# Patient Record
Sex: FEMALE | Race: WHITE | Employment: OTHER | URBAN - METROPOLITAN AREA
[De-identification: names, ages, dates, MRNs, and addresses within clinical notes are randomized per-mention and may not be internally consistent; named-entity substitution may affect disease eponyms.]

---

## 2020-10-08 ENCOUNTER — TELEPHONE (OUTPATIENT)
Dept: ADMINISTRATIVE | Age: 80
End: 2020-10-08

## 2020-10-08 NOTE — TELEPHONE ENCOUNTER
Patient called stated she was seen at  [facility] and her PCP Dr. Neha Tariq referred her to see Dr. Sudarshan Walker For a right broken arm, she can be reached at [phone].

## 2020-10-14 ENCOUNTER — OFFICE VISIT (OUTPATIENT)
Dept: ORTHOPEDIC SURGERY | Age: 80
End: 2020-10-14
Payer: MEDICARE

## 2020-10-14 ENCOUNTER — HOSPITAL ENCOUNTER (OUTPATIENT)
Dept: GENERAL RADIOLOGY | Age: 80
Discharge: HOME | End: 2020-10-16
Payer: MEDICARE

## 2020-10-14 VITALS
WEIGHT: 170 LBS | HEART RATE: 90 BPM | HEIGHT: 58 IN | DIASTOLIC BLOOD PRESSURE: 73 MMHG | SYSTOLIC BLOOD PRESSURE: 151 MMHG | BODY MASS INDEX: 35.68 KG/M2

## 2020-10-14 PROBLEM — S42.301A CLOSED FRACTURE OF RIGHT UPPER LIMB: Status: ACTIVE | Noted: 2020-10-14

## 2020-10-14 PROCEDURE — 99202 OFFICE O/P NEW SF 15 MIN: CPT

## 2020-10-14 PROCEDURE — 73080 X-RAY EXAM OF ELBOW: CPT

## 2020-10-14 PROCEDURE — 99203 OFFICE O/P NEW LOW 30 MIN: CPT | Performed by: PHYSICIAN ASSISTANT

## 2020-10-14 PROCEDURE — 73060 X-RAY EXAM OF HUMERUS: CPT

## 2020-10-14 RX ORDER — ACETAMINOPHEN 500 MG
500 TABLET ORAL EVERY 6 HOURS PRN
Status: ON HOLD | COMMUNITY
End: 2020-10-26 | Stop reason: HOSPADM

## 2020-10-14 SDOH — HEALTH STABILITY: MENTAL HEALTH: HOW OFTEN DO YOU HAVE A DRINK CONTAINING ALCOHOL?: NEVER

## 2020-10-14 NOTE — PROGRESS NOTES
Orthopaedic H&P Note    Ines Rueda is a 78 y.o. female, her YOB: 1940 with the following history as recorded in [facility]:      Patient Active Problem List    Diagnosis Date Noted    Closed fracture of right upper limb 10/14/2020     Current Outpatient Medications   Medication Sig Dispense Refill    acetaminophen (TYLENOL) 500 MG tablet Take 500 mg by mouth every 6 hours as needed for Pain       No current facility-administered medications for this visit. Allergies: Patient has no known allergies. History reviewed. No pertinent past medical history. Past Surgical History:   Procedure Laterality Date    HIP FRACTURE SURGERY Right     @ [facility], Dr Jerrell Nolan     History reviewed. No pertinent family history. Social History     Tobacco Use    Smoking status: Never Smoker    Smokeless tobacco: Never Used   Substance Use Topics    Alcohol use: Never     Frequency: Never                             Chief Complaint   Patient presents with    Arm Pain     patient fell on 10/5/20 while walking in [place]; patient was taken to ER at [facility]; 5/10 pain; patient states that if she does not move her arm it does not hurt; she has been taking tylenol for the pain    Other     right handed; patient lives with her  and daughter stops over all the time       SUBJECTIVE: Ines Rueda is here for initial evaluation for their right elbow. States that they had injured it after falling. DOI: 10/5/2020. Was seen in ER at [facility] and XRs revealed distal humerus fracture. They were placed in a long posterior splint and referred here. Denies any other injuries, and no issues with this elbow prior to injury. Denies any numbness or tingling. Pain tolerable currently with medications, taking tylenol. Patient is right hand dominant. Review of Systems   Constitutional: Negative for fever, chills, diaphoresis, appetite change and fatigue. HENT: Negative for dental issues, hearing loss and tinnitus. Negative for congestion, sinus pressure, sneezing, sore throat. Negative for headache. Eyes: Negative for visual disturbance, blurred and double vision. Negative for pain, discharge, redness and itching  Respiratory: Negative for cough, shortness of breath and wheezing. Cardiovascular: Negative for chest pain, palpitations and leg swelling. No dyspnea on exertion   Gastrointestinal:   Negative for nausea, vomiting, abdominal pain, diarrhea, constipation  or black or bloody. Hematologic\Lymphatic:  negative for bleeding, petechiae,   Genitourinary: Negative for hematuria and difficulty urinating. Musculoskeletal: Negative for neck pain and stiffness. Mild for back pain, negative joint swelling and gait problem. Skin: Negative for pallor, rash and wound. Neurological: Negative for dizziness, tremors, seizures, weakness, light-headedness, no TIA or stroke symptoms. No numbness and headaches. Psychiatric/Behavioral: Negative. Physical Examination:   General appearance: alert, well appearing, and in no distress,  normal appearing weight  Mental status: alert, oriented to person, place, and time, normal mood, behavior, speech, dress, motor activity, and thought processes  Abdomen: soft, nondistended, nontender, bowel sound + X 4 quads  Resp:   resp easy and unlabored, equal, regular rate, no wheezes, rhonchi, crackles noted  Cardiac: distal pulses palpable, skin well perfused.  HR regular rhythm and rate, no murmers, rubs, or clicks  Neurological: alert, oriented X3, normal speech, no focal findings or movement disorder noted, motor and sensory grossly normal bilaterally, normal muscle tone, no tremors, strength 5/5, normal gait and station  HEENT: normochephalic atraumatic, external ears and eyes normal, sclera normal, neck supple  Extremities:   peripheral pulses normal, no edema, redness or tenderness in the calves   Skin: normal coloration, no rashes or open wounds, no suspicious skin lesions noted  Psych: Affect euthymic   Musculoskeletal:    Extremity:  Right Upper Extremity  Splint maintained  Splint is clean, dry, intact  Moderate edema noted to the fingers with ecchymosis noted  Radial pulse palpable, fingers warm with BCR  Flex/extension intact to wrist, thumb and fingers  Finger opposition intact  Finger adduction/abduction intact  Finger crossover intact  Subjectively states sensation intact to radial/medial/ulnar distribution                 BP (!) 151/73 (Site: Left Upper Arm, Position: Sitting, Cuff Size: Medium Adult)   Pulse 90   Ht 4' 10\" (1.473 m)   Wt 170 lb (77.1 kg)   BMI 35.53 kg/m²      XR: 10/14/20 multiple views of the right humerus and right elbow are obtained with splint maintained. Demonstrates a displaced distal humerus fracture, unable to fully evaluate fracture pattern given splinting and positioning. ASSESSMENT:     Diagnosis Orders   1. Closed fracture of right upper extremity, initial encounter  XR HUMERUS RIGHT (MIN 2 VIEWS)    XR ELBOW RIGHT (MIN 3 VIEWS)    CT ELBOW RIGHT WO CONTRAST   2. Other closed displaced fracture of distal end of right humerus, initial encounter         Discussion:  Had lengthy discussion with patient regarding Her diagnosis, typical prognosis, and expected outcomes. I reviewed the possible complications from the injury itself despite treatment choosen. I also discussed treatment options including nonoperative managements versus surgical management, along with risks and benefits of each. They have elected for surgical management at this time. Discussed with patient factors that can impact patient's fracture healing. Patient has the following risk factors for union: Advanced Age    Risk, benefits and treatment options discussed with Stephanie Chambers.  she has verbalized understanding of options.  The possibility of complications were also discussed to include but not limited to nerve damage, infection, problems with wound healing, vascular injury, chronic pain, stiffness, dysfunction, nonhealing of the bone, symptomatic hardware and/or its failure, need for subsequent surgery, dislocation, and blood clots as well as medical related problems and other problems not specifically discussed. Risk of anesthesia also discussed to include death. Post-op care, work, activity and restrictions which included the use of pain medication and possibility of using blood thinner post op were also discussed with Stephanie Trish and she verbalized and agreed with the restrictions. PLAN:  Pre-OP CT for surgical planning  Plan for OR on 10/26/2020 tentatively with Dr. Hannah Sims MD  Pre-surgery medical clearance- request faxed to Dr. Strange Rise office today  NPO after midnight the night before surgery  NWB on right upper extremity  Splint care  Continue ice and elevation as able  Sling for comfort  Hold NSAIDS 7 days prior to surgery  Hold aspirin 1 prior to surgery    Electronically signed by Buddy Ortiz PA-C on 10/14/2020 at 11:18 AM  Note: This report was completed using [employer] voiced recognition software. Every effort has been made to ensure accuracy; however, inadvertent computerized transcription errors may be present.

## 2020-10-14 NOTE — PROGRESS NOTES
Chief Complaint   Patient presents with    Arm Pain     patient fell on 10/5/20 while walking in [place]; patient was taken to ER at [facility]; 5/10 pain; patient states that if she does not move her arm it does not hurt; she has been taking tylenol for the pain    Other     right handed; patient lives with her  and daughter stops over all the time       SUBJECTIVE:    Review of Systems   Constitutional: Negative for fever, chills, diaphoresis, appetite change and fatigue. HENT: Negative for dental issues, hearing loss and tinnitus. Negative for congestion, sinus pressure, sneezing, sore throat. Negative for headache. Eyes: Negative for visual disturbance, blurred and double vision. Negative for pain, discharge, redness and itching  Respiratory: Negative for cough, shortness of breath and wheezing. Cardiovascular: Negative for chest pain, palpitations and leg swelling. No dyspnea on exertion   Gastrointestinal:   Negative for nausea, vomiting, abdominal pain, diarrhea, constipation  or black or bloody. Hematologic\Lymphatic:  negative for bleeding, petechiae,   Genitourinary: Negative for hematuria and difficulty urinating. Musculoskeletal: Negative for neck pain and stiffness. Negative for back pain, see HPI  Skin: Negative for pallor, rash and wound. Neurological: Negative for dizziness, tremors, seizures, weakness, light-headedness, no TIA or stroke symptoms. No numbness and headaches. Psychiatric/Behavioral: Negative. OBJECTIVE:      Physical Examination:   General appearance: alert, well appearing, and in no distress,  normal appearing weight.  No visible signs of trauma   Mental status: alert, oriented to person, place, and time, normal mood, behavior, speech, dress, motor activity, and thought processes  Abdomen: soft, nondistended  Resp:   resp easy and unlabored, no audible wheezes note, normal symmetrical expansion of both hemithoraces  Cardiac: distal pulses palpable,

## 2020-10-15 ENCOUNTER — PREP FOR PROCEDURE (OUTPATIENT)
Dept: ORTHOPEDIC SURGERY | Age: 80
End: 2020-10-15

## 2020-10-15 ENCOUNTER — TELEPHONE (OUTPATIENT)
Dept: ADMINISTRATIVE | Age: 80
End: 2020-10-15

## 2020-10-15 RX ORDER — SODIUM CHLORIDE 0.9 % (FLUSH) 0.9 %
10 SYRINGE (ML) INJECTION EVERY 12 HOURS SCHEDULED
Status: CANCELLED | OUTPATIENT
Start: 2020-10-15

## 2020-10-15 RX ORDER — SODIUM CHLORIDE 0.9 % (FLUSH) 0.9 %
10 SYRINGE (ML) INJECTION PRN
Status: CANCELLED | OUTPATIENT
Start: 2020-10-15

## 2020-10-15 NOTE — H&P (VIEW-ONLY)
Orthopaedic H&P Note     Suresh Hackett is a 78 y.o. female, her YOB: 1940 with the following history as recorded in [facility]:             Patient Active Problem List     Diagnosis Date Noted    Closed fracture of right upper limb 10/14/2020      Current Facility-Administered Medications          Current Outpatient Medications   Medication Sig Dispense Refill    acetaminophen (TYLENOL) 500 MG tablet Take 500 mg by mouth every 6 hours as needed for Pain          No current facility-administered medications for this visit. Allergies: Patient has no known allergies. Past Medical History   History reviewed. No pertinent past medical history. Past Surgical History         Past Surgical History:   Procedure Laterality Date    HIP FRACTURE SURGERY Right       @ [facility], Dr Johnny Tena        Family History   History reviewed. No pertinent family history. Social History            Tobacco Use    Smoking status: Never Smoker    Smokeless tobacco: Never Used   Substance Use Topics    Alcohol use: Never       Frequency: Never                               Chief Complaint   Patient presents with    Arm Pain       patient fell on 10/5/20 while walking in [place]; patient was taken to ER at [facility]; 5/10 pain; patient states that if she does not move her arm it does not hurt; she has been taking tylenol for the pain    Other       right handed; patient lives with her  and daughter stops over all the time         SUBJECTIVE: Suresh Hackett is here for initial evaluation for their right elbow. States that they had injured it after falling. DOI: 10/5/2020. Was seen in ER at [facility] and XRs revealed distal humerus fracture. They were placed in a long posterior splint and referred here. Denies any other injuries, and no issues with this elbow prior to injury. Denies any numbness or tingling. Pain tolerable currently with medications, taking tylenol. Patient is right hand dominant.    Review of Systems   Constitutional: Negative for fever, chills, diaphoresis, appetite change and fatigue. HENT: Negative for dental issues, hearing loss and tinnitus. Negative for congestion, sinus pressure, sneezing, sore throat. Negative for headache. Eyes: Negative for visual disturbance, blurred and double vision. Negative for pain, discharge, redness and itching  Respiratory: Negative for cough, shortness of breath and wheezing. Cardiovascular: Negative for chest pain, palpitations and leg swelling. No dyspnea on exertion   Gastrointestinal:   Negative for nausea, vomiting, abdominal pain, diarrhea, constipation  or black or bloody. Hematologic\Lymphatic:  negative for bleeding, petechiae,   Genitourinary: Negative for hematuria and difficulty urinating. Musculoskeletal: Negative for neck pain and stiffness. Mild for back pain, negative joint swelling and gait problem. Skin: Negative for pallor, rash and wound. Neurological: Negative for dizziness, tremors, seizures, weakness, light-headedness, no TIA or stroke symptoms. No numbness and headaches. Psychiatric/Behavioral: Negative.      Physical Examination:   General appearance: alert, well appearing, and in no distress,  normal appearing weight  Mental status: alert, oriented to person, place, and time, normal mood, behavior, speech, dress, motor activity, and thought processes  Abdomen: soft, nondistended, nontender, bowel sound + X 4 quads  Resp:   resp easy and unlabored, equal, regular rate, no wheezes, rhonchi, crackles noted  Cardiac: distal pulses palpable, skin well perfused.  HR regular rhythm and rate, no murmers, rubs, or clicks  Neurological: alert, oriented X3, normal speech, no focal findings or movement disorder noted, motor and sensory grossly normal bilaterally, normal muscle tone, no tremors, strength 5/5, normal gait and station  HEENT: normochephalic atraumatic, external ears and eyes normal, sclera normal, neck supple  Extremities:   peripheral pulses normal, no edema, redness or tenderness in the calves   Skin: normal coloration, no rashes or open wounds, no suspicious skin lesions noted  Psych: Affect euthymic   Musculoskeletal:    Extremity:  Right Upper Extremity  Splint maintained  Splint is clean, dry, intact  Moderate edema noted to the fingers with ecchymosis noted  Radial pulse palpable, fingers warm with BCR  Flex/extension intact to wrist, thumb and fingers  Finger opposition intact  Finger adduction/abduction intact  Finger crossover intact  Subjectively states sensation intact to radial/medial/ulnar distribution                   BP (!) 151/73 (Site: Left Upper Arm, Position: Sitting, Cuff Size: Medium Adult)   Pulse 90   Ht 4' 10\" (1.473 m)   Wt 170 lb (77.1 kg)   BMI 35.53 kg/m²      XR: 10/14/20 multiple views of the right humerus and right elbow are obtained with splint maintained. Demonstrates a displaced distal humerus fracture, unable to fully evaluate fracture pattern given splinting and positioning.     ASSESSMENT:       Diagnosis Orders   1. Closed fracture of right upper extremity, initial encounter  XR HUMERUS RIGHT (MIN 2 VIEWS)     XR ELBOW RIGHT (MIN 3 VIEWS)     CT ELBOW RIGHT WO CONTRAST   2. Other closed displaced fracture of distal end of right humerus, initial encounter            Discussion:  Had lengthy discussion with patient regarding Her diagnosis, typical prognosis, and expected outcomes. I reviewed the possible complications from the injury itself despite treatment choosen. I also discussed treatment options including nonoperative managements versus surgical management, along with risks and benefits of each. They have elected for surgical management at this time.       Discussed with patient factors that can impact patient's fracture healing.  Patient has the following risk factors for union: Advanced Age     Risk, benefits and treatment options discussed with Inna Montes.  she has verbalized understanding of options. The possibility of complications were also discussed to include but not limited to nerve damage, infection, problems with wound healing, vascular injury, chronic pain, stiffness, dysfunction, nonhealing of the bone, symptomatic hardware and/or its failure, need for subsequent surgery, dislocation, and blood clots as well as medical related problems and other problems not specifically discussed. Risk of anesthesia also discussed to include death.    Post-op care, work, activity and restrictions which included the use of pain medication and possibility of using blood thinner post op were also discussed with Sarahi Dumas and she verbalized and agreed with the restrictions.      PLAN:  Pre-OP CT for surgical planning  Plan for OR on 10/26/2020 tentatively with Dr. Daysi Walsh MD  Pre-surgery medical clearance- request faxed to Dr. Toi Childs office today  NPO after midnight the night before surgery  NWB on right upper extremity  Splint care  Continue ice and elevation as able  Sling for comfort  Hold NSAIDS 7 days prior to surgery  Hold aspirin 1 prior to surgery

## 2020-10-15 NOTE — H&P
Orthopaedic H&P Note     Jian Aguero is a 78 y.o. female, her YOB: 1940 [facility] the [facility]             Patient Active Problem List     Diagnosis Date Noted    Closed fracture of right upper limb 10/14/2020      Current Facility-Administered Medications          Current Outpatient Medications   Medication Sig Dispense Refill    acetaminophen (TYLENOL) 500 MG tablet Take 500 mg by mouth every 6 hours as needed for Pain          No current facility-administered medications for this visit. Allergies: Patient has no known allergies. Past Medical History   History reviewed. No pertinent past medical history. Past Surgical History         Past Surgical History:   Procedure Laterality Date    HIP FRACTURE SURGERY Right       @ [facility], Dr Arlene Avalos        Family History   History reviewed. No pertinent family history. Social History            Tobacco Use    Smoking status: Never Smoker    Smokeless tobacco: Never Used   Substance Use Topics    Alcohol use: Never       Frequency: Never                               Chief Complaint   Patient presents with    Arm Pain       patient fell on 10/5/20 while walking in [place]; patient was taken to ER at [facility]; 5/10 pain; patient states that if she does not move her arm it does not hurt; she has been taking tylenol for the pain    Other       right handed; patient lives with her  and daughter stops over all the time         SUBJECTIVE: Jian Aguero is here for initial evaluation for their right elbow. States that they had injured it after falling. DOI: 10/5/2020. Was seen in ER at [facility] and XRs revealed distal humerus fracture. They were placed in a long posterior splint and referred here. Denies any other injuries, and no issues with this elbow prior to injury. Denies any numbness or tingling. Pain tolerable currently with medications, taking tylenol. Patient is right hand dominant.    Review of Systems   Constitutional: Negative for fever, chills, diaphoresis, appetite change and fatigue. HENT: Negative for dental issues, hearing loss and tinnitus. Negative for congestion, sinus pressure, sneezing, sore throat. Negative for headache. Eyes: Negative for visual disturbance, blurred and double vision. Negative for pain, discharge, redness and itching  Respiratory: Negative for cough, shortness of breath and wheezing. Cardiovascular: Negative for chest pain, palpitations and leg swelling. No dyspnea on exertion   Gastrointestinal:   Negative for nausea, vomiting, abdominal pain, diarrhea, constipation  or black or bloody. Hematologic\Lymphatic:  negative for bleeding, petechiae,   Genitourinary: Negative for hematuria and difficulty urinating. Musculoskeletal: Negative for neck pain and stiffness. Mild for back pain, negative joint swelling and gait problem. Skin: Negative for pallor, rash and wound. Neurological: Negative for dizziness, tremors, seizures, weakness, light-headedness, no TIA or stroke symptoms. No numbness and headaches. Psychiatric/Behavioral: Negative.      Physical Examination:   General appearance: alert, well appearing, and in no distress,  normal appearing weight  Mental status: alert, oriented to person, place, and time, normal mood, behavior, speech, dress, motor activity, and thought processes  Abdomen: soft, nondistended, nontender, bowel sound + X 4 quads  Resp:   resp easy and unlabored, equal, regular rate, no wheezes, rhonchi, crackles noted  Cardiac: distal pulses palpable, skin well perfused.  HR regular rhythm and rate, no murmers, rubs, or clicks  Neurological: alert, oriented X3, normal speech, no focal findings or movement disorder noted, motor and sensory grossly normal bilaterally, normal muscle tone, no tremors, strength 5/5, normal gait and station  HEENT: normochephalic atraumatic, external ears and eyes normal, sclera normal, neck verbalized understanding of options. The possibility of complications were also discussed to include but not limited to nerve damage, infection, problems with wound healing, vascular injury, chronic pain, stiffness, dysfunction, nonhealing of the bone, symptomatic hardware and/or its failure, need for subsequent surgery, dislocation, and blood clots as well as medical related problems and other problems not specifically discussed. Risk of anesthesia also discussed to include death.    Post-op care, work, activity and restrictions which included the use of pain medication and possibility of using blood thinner post op were also discussed with Stephanie Chambers and she verbalized and agreed with the restrictions.      PLAN:  Pre-OP CT for surgical planning  Plan for OR on 10/26/2020 tentatively with Dr. Hannah Sims MD  Pre-surgery medical clearance- request faxed to Dr. Alie Duvall office today  NPO after midnight the night before surgery  NWB on right upper extremity  Splint care  Continue ice and elevation as able  Sling for comfort  Hold NSAIDS 7 days prior to surgery  Hold aspirin 1 prior to surgery

## 2020-10-19 ENCOUNTER — HOSPITAL ENCOUNTER (OUTPATIENT)
Dept: CT IMAGING | Age: 80
Discharge: HOME | End: 2020-10-21
Payer: MEDICARE

## 2020-10-19 PROCEDURE — 73200 CT UPPER EXTREMITY W/O DYE: CPT

## 2020-10-20 ENCOUNTER — OFFICE VISIT (OUTPATIENT)
Dept: ORTHOPEDIC SURGERY | Age: 80
End: 2020-10-20
Payer: MEDICARE

## 2020-10-20 ENCOUNTER — HOSPITAL ENCOUNTER (OUTPATIENT)
Age: 80
Discharge: HOME | End: 2020-10-22
Payer: MEDICARE

## 2020-10-20 VITALS
DIASTOLIC BLOOD PRESSURE: 89 MMHG | BODY MASS INDEX: 35.68 KG/M2 | HEART RATE: 86 BPM | TEMPERATURE: 98.2 F | HEIGHT: 58 IN | WEIGHT: 170 LBS | SYSTOLIC BLOOD PRESSURE: 148 MMHG

## 2020-10-20 PROCEDURE — 99213 OFFICE O/P EST LOW 20 MIN: CPT | Performed by: PHYSICIAN ASSISTANT

## 2020-10-20 PROCEDURE — 99212 OFFICE O/P EST SF 10 MIN: CPT | Performed by: PHYSICIAN ASSISTANT

## 2020-10-20 PROCEDURE — U0003 INFECTIOUS AGENT DETECTION BY NUCLEIC ACID (DNA OR RNA); SEVERE ACUTE RESPIRATORY SYNDROME CORONAVIRUS 2 (SARS-COV-2) (CORONAVIRUS DISEASE [COVID-19]), AMPLIFIED PROBE TECHNIQUE, MAKING USE OF HIGH THROUGHPUT TECHNOLOGIES AS DESCRIBED BY CMS-2020-01-R: HCPCS

## 2020-10-20 NOTE — PROGRESS NOTES
change and fatigue. HENT: Negative for dental issues, hearing loss and tinnitus. Negative for congestion, sinus pressure, sneezing, sore throat. Negative for headache. Eyes: Negative for visual disturbance, blurred and double vision. Negative for pain, discharge, redness and itching  Respiratory: Negative for cough, shortness of breath and wheezing. Cardiovascular: Negative for chest pain, palpitations and leg swelling. No dyspnea on exertion   Gastrointestinal:   Negative for nausea, vomiting, abdominal pain, diarrhea, constipation  or black or bloody. Hematologic\Lymphatic:  negative for bleeding, petechiae,   Genitourinary: Negative for hematuria and difficulty urinating. Musculoskeletal: Negative for neck pain and stiffness. Mild for back pain, negative joint swelling and gait problem. Skin: Negative for pallor, rash and wound. Neurological: Negative for dizziness, tremors, seizures, weakness, light-headedness, no TIA or stroke symptoms. No numbness and headaches. Psychiatric/Behavioral: Negative. Physical Examination:   General appearance: alert, well appearing, and in no distress,  normal appearing weight  Mental status: alert, oriented to person, place, and time, normal mood, behavior, speech, dress, motor activity, and thought processes  Abdomen: soft, nondistended, nontender, bowel sound + X 4 quads  Resp:   resp easy and unlabored, equal, regular rate, no wheezes, rhonchi, crackles noted  Cardiac: distal pulses palpable, skin well perfused.  HR regular rhythm and rate, no murmers, rubs, or clicks  Neurological: alert, oriented X3, normal speech, no focal findings or movement disorder noted, motor and sensory grossly normal bilaterally, normal muscle tone, no tremors, strength 5/5, normal gait and station  HEENT: normochephalic atraumatic, external ears and eyes normal, sclera normal, neck supple  Extremities:   peripheral pulses normal, no edema, redness or tenderness in the calves   Skin: normal coloration, no rashes or open wounds, no suspicious skin lesions noted  Psych: Affect euthymic   Musculoskeletal:    Extremity:  Right Upper Extremity  Splint maintained  Splint is clean, dry, intact  Mild noted to the fingers with ecchymosis noted- improving since last visit  Radial pulse palpable, fingers warm with BCR  Flex/extension intact to wrist, thumb and fingers  Finger opposition intact  Finger adduction/abduction intact  Finger crossover intact  Subjectively states sensation intact to radial/medial/ulnar distribution    BP (!) 148/89 (Site: Left Upper Arm, Position: Sitting)   Pulse 86   Temp 98.2 °F (36.8 °C) (Oral)   Ht 4' 10\" (1.473 m)   Wt 170 lb (77.1 kg) Comment: per patient  BMI 35.53 kg/m²         Bones: Acute transversely oriented comminuted posteriorly and laterally    displaced condylar fracture of the distal humerus.  There is up to 8 mm    posterior displacement of the medial epicondyle on image 60, series 5.  There    is up to 7 mm of lateral displacement of the condylar fracture component in    relation to the humeral diaphyseal component on image 62, series 6.         Radial head and radial neck appear intact.         Soft Tissue:  Gallbladder filled with gallstones.         Respiratory motion throughout the visualized portions of the lungs.         Moderate edema in the soft tissues about the right elbow and proximal right    forearm.         Joint:  Mild degenerative changes of the ulnohumeral and radiohumeral joints.         Moderate joint effusion.              Impression    1. Acute transversely oriented comminuted posteriorly and laterally displaced    condylar fracture of the distal humerus with up to 8 mm of posterior and 7 mm    lateral displacement of the condylar fracture component in relation to the    humeral diaphyseal component. Of note, CT was reviewed and case discussed with Dr. Beny Lira as well today and he is recommending surgical intervention.  Separate discussion will be had face to face with Dr. Pablo Singer the morning of surgery with patient regarding risks etc.     ASSESSMENT:     Diagnosis Orders   1. Screening for viral disease  COVID-19 Ambulatory   2. Other closed displaced fracture of distal end of right humerus, initial encounter         Discussion:  Dr. Tereso Camarena and myself had lengthy discussion with patient and her family regarding Her diagnosis, typical prognosis, and expected outcomes. I reviewed the possible complications from the injury itself despite treatment choosen. I also discussed treatment options including nonoperative managements versus surgical management, along with risks and benefits of each. They have elected for surgical management at this time. Discussed with patient factors that can impact patient's fracture healing. Patient has the following risk factors for union: Advanced Age    Risk, benefits and treatment options discussed with Frandy Smith.  she has verbalized understanding of options. The possibility of complications were also discussed to include but not limited to nerve damage, infection, problems with wound healing, vascular injury, chronic pain, stiffness, dysfunction, nonhealing of the bone, symptomatic hardware and/or its failure, need for subsequent surgery, dislocation, and blood clots as well as medical related problems and other problems not specifically discussed. Risk of anesthesia also discussed to include death. Post-op care, work, activity and restrictions which included the use of pain medication and possibility of using blood thinner post op were also discussed with Frandy Smith and she verbalized and agreed with the restrictions.      PLAN:  Plan for OR on 10/26/2020 with Dr. Sanju Purcell MD  Pre-surgery medical clearance, patient states sees PCP this week  NPO after midnight the night before surgery  NWB on right upper extremity  COVID 19  Splint care  Hold NSAIDS 7 days prior to surgery  Hold aspirin 1 prior to surgery    Electronically signed by Pola Dorsey PA-C on 10/23/2020 at 7:43 AM  Note: This report was completed using [email or web address] voiced recognition software. Every effort has been made to ensure accuracy; however, inadvertent computerized transcription errors may be present.

## 2020-10-20 NOTE — PATIENT INSTRUCTIONS
1. Your surgery is scheduled for Right Distal Humerus Open Reduction with Internal Fixation at 8:30 AM on 10/26/2020  with Dr. Arlene Bernal MD at the main [address] [place] [address] in [facility] . You will need to report to Preop area  that morning at 6:30 AM    2. You are having Outpatient surgery so you will be returning home the same day  3. Preadmission Testing (PAT) department at [facility] will contact you with all the details prior to surgery. 4. Nothing to eat or drink after midnight the night before surgery. You may take a pain pill and any other medicine PAT instructs you to take with small sip of water if needed. 5. Keep splint clean and dry. Do not remove or get wet. 6. Continue with ice and elevation to reduce swelling  7. No weight bearing right upper extremity, use assistive devices  8. Take pain medicine as instructed  9. Call office with any question or concerns: [phone]. Hold Aspirin the day of surgery. Hold all NSAIDs 7 days prior to surgery  11. Pre OP COVID 19 testing    All surgical patients will be gradually tapered off narcotic pain medication post operatively, this office will not continue narcotics longer than 6 weeks from date of surgery. If narcotics are required longer than this time period without objective finding you will be referred to pain management.       OUTPATIENT ORTHOPEDIC SURGERY  PRE-OP COVID TESTING     We need to have COVID-19 Testing done 4 days (not including Sunday) prior to your scheduled surgery     After your testing is completed you will need to Self Quarantine until date of surgery     If your surgery is scheduled for:  Monday- Testing needs to be done Wednesday    Locations and hours are listed below:    [address], [place] --Across from [phone] signage will be posted but for those accessing the [place] site, note that they should enter from [facility] onto [facility]. [address] [facility] [address]    Each of these sites will be open Monday through Friday from 6 a.m. to 2:30 p.m.

## 2020-10-21 NOTE — PROGRESS NOTES
[facility] PRE-ADMISSION TESTING GENERAL INSTRUCTIONS- [facility]-phone number:[phone]    GENERAL INSTRUCTIONS  [x] Antibacterial Soap shower Night before and/or AM of Surgery  [] Chandrakant wipe instruction sheet and wipes given. [x] Nothing by mouth after midnight, including gum, candy, mints, or water. [x] You may brush your teeth, gargle, but do NOT swallow water. []Hibiclens shower  the night before and the morning of surgery. Do not use             Hibiclens on your face or head. [x]No smoking, chewing tobacco, illegal drugs, or alcohol within 24 hours of your surgery. [x] Jewelry, valuables or body piercing's should not be brought to the hospital. All body and/or tongue piercing's must be removed prior to arriving to hospital.  ALL hair pins must be removed. [x] Do not wear makeup, lotions, powders, deodorant. Nail polish as directed by the nurse. [x] Arrange transportation with a responsible adult  to and from the hospital. If you do not have a responsible adult  to transport you, you will need to make arrangements with a medical transportation company (i.e. [employer]. A Uber/taxi/bus is not appropriate unless you are accompanied by a responsible adult ). Arrange for someone to be with you for the remainder of the day and for 24 hours after your procedure due to having had anesthesia. Who will be your  for transportation?    Who will be staying with you for 24 hrs after your procedure?   [x] Bring insurance card and photo ID.  [] Transfusion Bracelet: Please bring with you to hospital, day of surgery  [] Bring urine specimen day of surgery. Any small container is acceptable. [] Use inhalers the morning of surgery and bring with you to hospital.  [] Bring copy of living will or healthcare power of  papers to be placed in your electronic record.   [] CPAP/BI-PAP: Please bring your machine if you are to spend the night in the hospital.     PARKING INSTRUCTIONS:   [x] Arrival Time: 0630 main entrance, wear mask  · [] Parking lot '\"I\"  is located on [facility] (the corner of [facility] and [facility]). To enter, press the button and the gate will lift. A free token will be provided to exit the lot. One car per patient is allowed to park in this lot. All other cars are to park on [address] [place] [address] Street either in the parking garage or the handicap lot. [x] To reach the [facility] lobby from [address] [place] [address], upon entering the hospital, take elevator B to the 3rd floor. EDUCATION INSTRUCTIONS:      [] Knee or hip replacement booklet & exercise pamphlets given. [] [address] placed in chart. [] Pre-admission Testing educational folder given  [] Incentive Spirometry,coughing & deep breathing exercises reviewed. []Medication information sheet(s)   []Fluoroscopy-Xray used in surgery reviewed with patient. Educational pamphlet placed in chart. [x]Pain: Post-op pain is normal and to be expected. You will be asked to rate your pain from 0-10(a zero is not acceptable-education is needed). Your post-op pain goal is:5  [] Ask your nurse for your pain medication. [] Joint camp offered. [] Joint replacement booklets given. [] Other:___________________________    MEDICATION INSTRUCTIONS:   [x]Bring a complete list of your medications, please write the last time you took the medicine, give this list to the nurse.   [] Take the following medications the morning of surgery with 1-2 ounces of water:   [x] Stop herbal supplements and vitamins 5 days before your surgery. [] DO NOT take any diabetic medicine the morning of surgery. Follow instructions for insulin the day before surgery. [] If you are diabetic and your blood sugar is low or you feel symptomatic, you may drink 1-2 ounces of apple juice or take a glucose tablet.   The morning of your procedure, you may call the pre-op area if you have concerns about your blood sugar [phone]. [] Use your inhalers the morning of surgery. Bring your emergency inhaler with you day of surgery. [] Follow physician instructions regarding any blood thinners you may be taking. WHAT TO EXPECT:  [x] The day of surgery you will be greeted and checked in by the [employer].  In addition, you will be registered in the [place] by a Patient Access Representative. Please bring your photo ID and insurance card. A nurse will greet you in accordance to the time you are needed in the pre-op area to prepare you for surgery. Please do not be discouraged if you are not greeted in the order you arrive as there are many variables that are involved in patient preparation. Your patience is greatly appreciated as you wait for your nurse. Please bring in items such as: books, magazines, newspapers, electronics, or any other items  to occupy your time in the waiting area. [x]  Delays may occur with surgery and staff will make a sincere effort to keep you informed of delays. If any delays occur with your procedure, we apologize ahead of time for your inconvenience as we recognize the value of your time.

## 2020-10-22 LAB
SARS-COV-2: NOT DETECTED
SOURCE: NORMAL

## 2020-10-23 ENCOUNTER — OFFICE VISIT (OUTPATIENT)
Dept: CARDIOLOGY CLINIC | Age: 80
End: 2020-10-23
Payer: MEDICARE

## 2020-10-23 VITALS
WEIGHT: 174 LBS | SYSTOLIC BLOOD PRESSURE: 106 MMHG | HEIGHT: 58 IN | HEART RATE: 92 BPM | DIASTOLIC BLOOD PRESSURE: 60 MMHG | RESPIRATION RATE: 16 BRPM | BODY MASS INDEX: 36.53 KG/M2

## 2020-10-23 PROCEDURE — 99203 OFFICE O/P NEW LOW 30 MIN: CPT | Performed by: INTERNAL MEDICINE

## 2020-10-23 PROCEDURE — 93000 ELECTROCARDIOGRAM COMPLETE: CPT | Performed by: INTERNAL MEDICINE

## 2020-10-23 NOTE — PROGRESS NOTES
OUTPATIENT CARDIOLOGY CONSULT    Name: Lena Lindo    Age: 78 y.o. Date of Service: 10/23/2020    Reason for Consultation: Preoperative cardiac evaluation for elbow surgery    Referring Physician: Fadumo Rutherford DO    History of Present Illness: Lena Lindo is a 78 y.o. female who presents today for preoperative cardiac evaluation. She has no cardiac history, specifically no coronary artery disease, congestive heart failure, arrhythmias, or valvular heart disease. No history of hypertension, diabetes, hyperlipidemia, and she is a lifetime non-smoker. She suffered a mechanical fall when she tripped the post office and broke her right elbow, and is planned for surgical repair next week. She has prior history of a fall a few years ago and required hip surgery that was done at [facility].    She does not routinely exercise, but is active and always up and about doing work at the house. She denies any physical limitation. She does not get short of breath or chest pain. She can walk up a flight of stairs without problems. Long distance ambulation is somewhat limited because of her hip pain related to her surgery. Review of Systems:  Complete review of systems otherwise negative except as described above. Past Medical History:  No past medical history on file. None    Past Surgical History:  Past Surgical History:   Procedure Laterality Date    HIP FRACTURE SURGERY Right     @ [facility], Dr Adi Peck       Family History:  No family history on file.    No family history of premature coronary artery disease    Social History:  Social History     Tobacco Use    Smoking status: Never Smoker    Smokeless tobacco: Never Used   Substance Use Topics    Alcohol use: Never     Frequency: Never    Drug use: Never       Allergies:  No Known Allergies    Current Medications:    Current Outpatient Medications:     acetaminophen (TYLENOL) 500 MG tablet, Take 500 mg by mouth every 6 hours as needed for Pain, Disp: , Rfl:     Physical Exam:  /60   Pulse 92   Resp 16   Ht 4' 10\" (1.473 m)   Wt 174 lb (78.9 kg)   BMI 36.37 kg/m²   Wt Readings from Last 3 Encounters:   10/23/20 174 lb (78.9 kg)   10/20/20 170 lb (77.1 kg)   10/14/20 170 lb (77.1 kg)     Appearance: Well-appearing older female, awake, alert, no acute respiratory distress  Skin: Intact, no rash  Eyes: EOMI, no conjunctival erythema  ENMT: Moist mucous membranes. Neck: Supple, no elevated JVP, no carotid bruits  Lungs: Clear to auscultation bilaterally. No wheezes, rales, or rhonchi. Cardiac: Regular rhythm with a normal rate. S1 & S2 normal, no murmurs  Abdomen: Soft, nontender, +bowel sounds  Extremities: Moves all extremities x 4, trace lower extremity edema. Right arm in a cast  Neurologic: No focal motor deficits apparent, normal mood and affect  Peripheral Pulses: Intact posterior tibial pulses bilaterally    Laboratory Tests:  None available    Cardiac Tests:  ECG: Sinus rhythm 92 bpm.  First-degree AV block, MS interval 234 ms. Normal axis. Poor R wave progression. Echocardiogram: N/A    Stress test: N/A    Cardiac catheterization: N/A    Orders Placed This Encounter   Procedures    EKG 12 Lead        Requested Prescriptions      No prescriptions requested or ordered in this encounter        ASSESSMENT / PLAN:  1. Preoperative cardiac evaluation for elbow surgery  2. Mechanical fall with fracture of the distal right humerus  3. First-degree AV block  4. Obesity, BMI 36.4 kg/m²  5. History of right hip fracture with surgical repair      Recommendations:  She is asymptomatic from cardiac standpoint. She has no active cardiac conditions, specifically no ischemic heart disease, congestive heart failure, significant valvular disease by exam, or arrhythmias. She has good functional capacity can exert greater than 4 METS and is asymptomatic. Her baseline EKG shows nonspecific changes.     · Low risk from a cardiac standpoint for a scheduled elbow surgery, and may proceed without further cardiac evaluation  · Aggressive risk factor modification  · Encouraged to increase physical activity and incorporate daily walking, as well as dietary modifications for weight reduction  · Follow-up as needed    Thank you for allowing me to participate in your patient's care. Please feel free to contact me if you have any questions or concerns.     Royal Kuldeep MD  [facility] ([facility]) Cardiology

## 2020-10-26 ENCOUNTER — ANESTHESIA EVENT (OUTPATIENT)
Dept: OPERATING ROOM | Age: 80
End: 2020-10-26
Payer: MEDICARE

## 2020-10-26 ENCOUNTER — APPOINTMENT (OUTPATIENT)
Dept: GENERAL RADIOLOGY | Age: 80
End: 2020-10-26
Attending: ORTHOPAEDIC SURGERY
Payer: MEDICARE

## 2020-10-26 ENCOUNTER — HOSPITAL ENCOUNTER (OUTPATIENT)
Age: 80
Setting detail: OUTPATIENT SURGERY
Discharge: HOME | End: 2020-10-26
Attending: ORTHOPAEDIC SURGERY | Admitting: ORTHOPAEDIC SURGERY
Payer: MEDICARE

## 2020-10-26 ENCOUNTER — ANESTHESIA (OUTPATIENT)
Dept: OPERATING ROOM | Age: 80
End: 2020-10-26
Payer: MEDICARE

## 2020-10-26 VITALS — DIASTOLIC BLOOD PRESSURE: 55 MMHG | TEMPERATURE: 95.2 F | SYSTOLIC BLOOD PRESSURE: 112 MMHG | OXYGEN SATURATION: 92 %

## 2020-10-26 VITALS
SYSTOLIC BLOOD PRESSURE: 123 MMHG | HEART RATE: 86 BPM | WEIGHT: 170 LBS | DIASTOLIC BLOOD PRESSURE: 52 MMHG | TEMPERATURE: 97.5 F | OXYGEN SATURATION: 93 % | BODY MASS INDEX: 35.68 KG/M2 | RESPIRATION RATE: 16 BRPM | HEIGHT: 58 IN

## 2020-10-26 LAB
ALBUMIN SERPL-MCNC: 3.5 G/DL (ref 3.5–5.2)
ALP BLD-CCNC: 82 U/L (ref 35–104)
ALT SERPL-CCNC: 7 U/L (ref 0–32)
ANION GAP SERPL CALCULATED.3IONS-SCNC: 12 MMOL/L (ref 7–16)
AST SERPL-CCNC: 17 U/L (ref 0–31)
BASOPHILS ABSOLUTE: 0.03 E9/L (ref 0–0.2)
BASOPHILS RELATIVE PERCENT: 0.5 % (ref 0–2)
BILIRUB SERPL-MCNC: 1 MG/DL (ref 0–1.2)
BUN BLDV-MCNC: 10 MG/DL (ref 8–23)
CALCIUM SERPL-MCNC: 8.5 MG/DL (ref 8.6–10.2)
CHLORIDE BLD-SCNC: 104 MMOL/L (ref 98–107)
CO2: 25 MMOL/L (ref 22–29)
CREAT SERPL-MCNC: 0.8 MG/DL (ref 0.5–1)
EOSINOPHILS ABSOLUTE: 0.14 E9/L (ref 0.05–0.5)
EOSINOPHILS RELATIVE PERCENT: 2.3 % (ref 0–6)
GFR AFRICAN AMERICAN: >60
GFR NON-AFRICAN AMERICAN: >60 ML/MIN/1.73
GLUCOSE BLD-MCNC: 99 MG/DL (ref 74–99)
HCT VFR BLD CALC: 35.9 % (ref 34–48)
HEMOGLOBIN: 11.3 G/DL (ref 11.5–15.5)
IMMATURE GRANULOCYTES #: 0.01 E9/L
IMMATURE GRANULOCYTES %: 0.2 % (ref 0–5)
LYMPHOCYTES ABSOLUTE: 1.33 E9/L (ref 1.5–4)
LYMPHOCYTES RELATIVE PERCENT: 21.8 % (ref 20–42)
MCH RBC QN AUTO: 30.4 PG (ref 26–35)
MCHC RBC AUTO-ENTMCNC: 31.5 % (ref 32–34.5)
MCV RBC AUTO: 96.5 FL (ref 80–99.9)
MONOCYTES ABSOLUTE: 0.47 E9/L (ref 0.1–0.95)
MONOCYTES RELATIVE PERCENT: 7.7 % (ref 2–12)
NEUTROPHILS ABSOLUTE: 4.13 E9/L (ref 1.8–7.3)
NEUTROPHILS RELATIVE PERCENT: 67.5 % (ref 43–80)
PDW BLD-RTO: 15.7 FL (ref 11.5–15)
PLATELET # BLD: 323 E9/L (ref 130–450)
PMV BLD AUTO: 10.4 FL (ref 7–12)
POTASSIUM SERPL-SCNC: 3.9 MMOL/L (ref 3.5–5)
RBC # BLD: 3.72 E12/L (ref 3.5–5.5)
SODIUM BLD-SCNC: 141 MMOL/L (ref 132–146)
TOTAL PROTEIN: 7.1 G/DL (ref 6.4–8.3)
WBC # BLD: 6.1 E9/L (ref 4.5–11.5)

## 2020-10-26 PROCEDURE — 3700000000 HC ANESTHESIA ATTENDED CARE: Performed by: ORTHOPAEDIC SURGERY

## 2020-10-26 PROCEDURE — 2580000003 HC RX 258: Performed by: ORTHOPAEDIC SURGERY

## 2020-10-26 PROCEDURE — 2720000010 HC SURG SUPPLY STERILE: Performed by: ORTHOPAEDIC SURGERY

## 2020-10-26 PROCEDURE — 6360000002 HC RX W HCPCS: Performed by: PHYSICIAN ASSISTANT

## 2020-10-26 PROCEDURE — 7100000000 HC PACU RECOVERY - FIRST 15 MIN: Performed by: ORTHOPAEDIC SURGERY

## 2020-10-26 PROCEDURE — 3700000001 HC ADD 15 MINUTES (ANESTHESIA): Performed by: ORTHOPAEDIC SURGERY

## 2020-10-26 PROCEDURE — 3600000015 HC SURGERY LEVEL 5 ADDTL 15MIN: Performed by: ORTHOPAEDIC SURGERY

## 2020-10-26 PROCEDURE — 7100000001 HC PACU RECOVERY - ADDTL 15 MIN: Performed by: ORTHOPAEDIC SURGERY

## 2020-10-26 PROCEDURE — 6360000002 HC RX W HCPCS: Performed by: ANESTHESIOLOGY

## 2020-10-26 PROCEDURE — 3209999900 FLUORO FOR SURGICAL PROCEDURES

## 2020-10-26 PROCEDURE — 36415 COLL VENOUS BLD VENIPUNCTURE: CPT

## 2020-10-26 PROCEDURE — 6360000002 HC RX W HCPCS: Performed by: REGISTERED NURSE

## 2020-10-26 PROCEDURE — 2500000003 HC RX 250 WO HCPCS: Performed by: REGISTERED NURSE

## 2020-10-26 PROCEDURE — 80053 COMPREHEN METABOLIC PANEL: CPT

## 2020-10-26 PROCEDURE — 85025 COMPLETE CBC W/AUTO DIFF WBC: CPT

## 2020-10-26 PROCEDURE — C1713 ANCHOR/SCREW BN/BN,TIS/BN: HCPCS | Performed by: ORTHOPAEDIC SURGERY

## 2020-10-26 PROCEDURE — 3600000005 HC SURGERY LEVEL 5 BASE: Performed by: ORTHOPAEDIC SURGERY

## 2020-10-26 PROCEDURE — 73070 X-RAY EXAM OF ELBOW: CPT

## 2020-10-26 PROCEDURE — 24546 OPTX HUM FX W/NTRCNDYLR XTN: CPT | Performed by: ORTHOPAEDIC SURGERY

## 2020-10-26 PROCEDURE — 64415 NJX AA&/STRD BRCH PLXS IMG: CPT | Performed by: ANESTHESIOLOGY

## 2020-10-26 PROCEDURE — 2709999900 HC NON-CHARGEABLE SUPPLY: Performed by: ORTHOPAEDIC SURGERY

## 2020-10-26 PROCEDURE — 7100000010 HC PHASE II RECOVERY - FIRST 15 MIN: Performed by: ORTHOPAEDIC SURGERY

## 2020-10-26 PROCEDURE — 6370000000 HC RX 637 (ALT 250 FOR IP): Performed by: ANESTHESIOLOGY

## 2020-10-26 PROCEDURE — 6370000000 HC RX 637 (ALT 250 FOR IP): Performed by: ORTHOPAEDIC SURGERY

## 2020-10-26 PROCEDURE — 7100000011 HC PHASE II RECOVERY - ADDTL 15 MIN: Performed by: ORTHOPAEDIC SURGERY

## 2020-10-26 DEVICE — SCREW BNE L20MM DIA2.7MM ANK S STL ST VAR ANG LOK FULL THRD: Type: IMPLANTABLE DEVICE | Site: ELBOW | Status: FUNCTIONAL

## 2020-10-26 DEVICE — SCREW BNE L26MM DIA2.7MM MTPHSEAL EL S STL ST VAR ANG LOK: Type: IMPLANTABLE DEVICE | Site: ELBOW | Status: FUNCTIONAL

## 2020-10-26 DEVICE — SCREW BNE L36MM DIA2.7MM ANK S STL ST VAR ANG LOK FULL THRD: Type: IMPLANTABLE DEVICE | Site: ELBOW | Status: FUNCTIONAL

## 2020-10-26 DEVICE — SCREW BNE L22MM DIA3.5MM CORT S STL ST NONCANNULATED LOK: Type: IMPLANTABLE DEVICE | Site: ELBOW | Status: FUNCTIONAL

## 2020-10-26 DEVICE — SCREW BNE L42MM DIA2.7MM ANK S STL ST VAR ANG LOK FULL THRD: Type: IMPLANTABLE DEVICE | Site: ELBOW | Status: FUNCTIONAL

## 2020-10-26 DEVICE — SCREW BNE L50MM DIA2.7MM S STL ST VAR ANG LOK FULL THRD T8: Type: IMPLANTABLE DEVICE | Site: ELBOW | Status: FUNCTIONAL

## 2020-10-26 DEVICE — SCREW BNE L30MM DIA2.7MM ANK S STL ST VAR ANG LOK FULL THRD: Type: IMPLANTABLE DEVICE | Site: ELBOW | Status: FUNCTIONAL

## 2020-10-26 DEVICE — IMPLANTABLE DEVICE: Type: IMPLANTABLE DEVICE | Site: ELBOW | Status: FUNCTIONAL

## 2020-10-26 DEVICE — SCREW CORTX SLFTP FTHRD 3.5X18MM: Type: IMPLANTABLE DEVICE | Site: ELBOW | Status: FUNCTIONAL

## 2020-10-26 DEVICE — SCREW BNE L14MM DIA2.7MM ANK S STL ST VAR ANG LOK FULL THRD: Type: IMPLANTABLE DEVICE | Site: ELBOW | Status: FUNCTIONAL

## 2020-10-26 DEVICE — SCREW BNE L40MM DIA2.7MM ANK S STL ST VAR ANG LOK FULL THRD: Type: IMPLANTABLE DEVICE | Site: ELBOW | Status: FUNCTIONAL

## 2020-10-26 DEVICE — SCREW BNE L20MM DIA3.5MM CORT S STL ST NONCANNULATED LOK: Type: IMPLANTABLE DEVICE | Site: ELBOW | Status: FUNCTIONAL

## 2020-10-26 DEVICE — SCREW BNE L24MM DIA3.5MM CORT S STL ST NONCANNULATED LOK: Type: IMPLANTABLE DEVICE | Site: ELBOW | Status: FUNCTIONAL

## 2020-10-26 RX ORDER — FENTANYL CITRATE 50 UG/ML
100 INJECTION, SOLUTION INTRAMUSCULAR; INTRAVENOUS ONCE
Status: COMPLETED | OUTPATIENT
Start: 2020-10-26 | End: 2020-10-26

## 2020-10-26 RX ORDER — DIAPER,BRIEF,INFANT-TODD,DISP
EACH MISCELLANEOUS PRN
Status: DISCONTINUED | OUTPATIENT
Start: 2020-10-26 | End: 2020-10-26 | Stop reason: ALTCHOICE

## 2020-10-26 RX ORDER — OXYCODONE HYDROCHLORIDE AND ACETAMINOPHEN 5; 325 MG/1; MG/1
1 TABLET ORAL EVERY 6 HOURS PRN
Qty: 28 TABLET | Refills: 0 | Status: SHIPPED | OUTPATIENT
Start: 2020-10-26 | End: 2020-11-02

## 2020-10-26 RX ORDER — HYDRALAZINE HYDROCHLORIDE 20 MG/ML
INJECTION INTRAMUSCULAR; INTRAVENOUS PRN
Status: DISCONTINUED | OUTPATIENT
Start: 2020-10-26 | End: 2020-10-26 | Stop reason: SDUPTHER

## 2020-10-26 RX ORDER — ROPIVACAINE HYDROCHLORIDE 5 MG/ML
30 INJECTION, SOLUTION EPIDURAL; INFILTRATION; PERINEURAL ONCE
Status: COMPLETED | OUTPATIENT
Start: 2020-10-26 | End: 2020-10-26

## 2020-10-26 RX ORDER — ROCURONIUM BROMIDE 10 MG/ML
INJECTION, SOLUTION INTRAVENOUS PRN
Status: DISCONTINUED | OUTPATIENT
Start: 2020-10-26 | End: 2020-10-26 | Stop reason: SDUPTHER

## 2020-10-26 RX ORDER — PROMETHAZINE HYDROCHLORIDE 25 MG/ML
6.25 INJECTION, SOLUTION INTRAMUSCULAR; INTRAVENOUS EVERY 10 MIN PRN
Status: DISCONTINUED | OUTPATIENT
Start: 2020-10-26 | End: 2020-10-26 | Stop reason: HOSPADM

## 2020-10-26 RX ORDER — PHENYLEPHRINE HCL IN 0.9% NACL 1 MG/10 ML
SYRINGE (ML) INTRAVENOUS PRN
Status: DISCONTINUED | OUTPATIENT
Start: 2020-10-26 | End: 2020-10-26 | Stop reason: SDUPTHER

## 2020-10-26 RX ORDER — HYDROCODONE BITARTRATE AND ACETAMINOPHEN 5; 325 MG/1; MG/1
1 TABLET ORAL PRN
Status: COMPLETED | OUTPATIENT
Start: 2020-10-26 | End: 2020-10-26

## 2020-10-26 RX ORDER — MORPHINE SULFATE 2 MG/ML
2 INJECTION, SOLUTION INTRAMUSCULAR; INTRAVENOUS EVERY 5 MIN PRN
Status: DISCONTINUED | OUTPATIENT
Start: 2020-10-26 | End: 2020-10-26 | Stop reason: HOSPADM

## 2020-10-26 RX ORDER — LIDOCAINE HYDROCHLORIDE 20 MG/ML
INJECTION, SOLUTION INTRAVENOUS PRN
Status: DISCONTINUED | OUTPATIENT
Start: 2020-10-26 | End: 2020-10-26 | Stop reason: SDUPTHER

## 2020-10-26 RX ORDER — FENTANYL CITRATE 50 UG/ML
INJECTION, SOLUTION INTRAMUSCULAR; INTRAVENOUS PRN
Status: DISCONTINUED | OUTPATIENT
Start: 2020-10-26 | End: 2020-10-26 | Stop reason: SDUPTHER

## 2020-10-26 RX ORDER — ASPIRIN 81 MG/1
81 TABLET ORAL DAILY
Qty: 28 TABLET | Refills: 0 | Status: SHIPPED | OUTPATIENT
Start: 2020-10-26 | End: 2020-11-23

## 2020-10-26 RX ORDER — LABETALOL HYDROCHLORIDE 5 MG/ML
INJECTION, SOLUTION INTRAVENOUS PRN
Status: DISCONTINUED | OUTPATIENT
Start: 2020-10-26 | End: 2020-10-26 | Stop reason: SDUPTHER

## 2020-10-26 RX ORDER — SODIUM CHLORIDE 9 MG/ML
INJECTION, SOLUTION INTRAVENOUS CONTINUOUS
Status: DISCONTINUED | OUTPATIENT
Start: 2020-10-26 | End: 2020-10-26 | Stop reason: HOSPADM

## 2020-10-26 RX ORDER — SODIUM CHLORIDE 0.9 % (FLUSH) 0.9 %
10 SYRINGE (ML) INJECTION PRN
Status: DISCONTINUED | OUTPATIENT
Start: 2020-10-26 | End: 2020-10-26 | Stop reason: HOSPADM

## 2020-10-26 RX ORDER — MORPHINE SULFATE 2 MG/ML
1 INJECTION, SOLUTION INTRAMUSCULAR; INTRAVENOUS EVERY 5 MIN PRN
Status: DISCONTINUED | OUTPATIENT
Start: 2020-10-26 | End: 2020-10-26 | Stop reason: HOSPADM

## 2020-10-26 RX ORDER — MEPERIDINE HYDROCHLORIDE 25 MG/ML
12.5 INJECTION INTRAMUSCULAR; INTRAVENOUS; SUBCUTANEOUS EVERY 5 MIN PRN
Status: DISCONTINUED | OUTPATIENT
Start: 2020-10-26 | End: 2020-10-26 | Stop reason: HOSPADM

## 2020-10-26 RX ORDER — DEXAMETHASONE SODIUM PHOSPHATE 10 MG/ML
INJECTION, SOLUTION INTRAMUSCULAR; INTRAVENOUS PRN
Status: DISCONTINUED | OUTPATIENT
Start: 2020-10-26 | End: 2020-10-26 | Stop reason: SDUPTHER

## 2020-10-26 RX ORDER — HYDROCODONE BITARTRATE AND ACETAMINOPHEN 5; 325 MG/1; MG/1
2 TABLET ORAL PRN
Status: COMPLETED | OUTPATIENT
Start: 2020-10-26 | End: 2020-10-26

## 2020-10-26 RX ORDER — ROPIVACAINE HYDROCHLORIDE 5 MG/ML
INJECTION, SOLUTION EPIDURAL; INFILTRATION; PERINEURAL
Status: COMPLETED | OUTPATIENT
Start: 2020-10-26 | End: 2020-10-26

## 2020-10-26 RX ORDER — GLYCOPYRROLATE 1 MG/5 ML
SYRINGE (ML) INTRAVENOUS PRN
Status: DISCONTINUED | OUTPATIENT
Start: 2020-10-26 | End: 2020-10-26 | Stop reason: SDUPTHER

## 2020-10-26 RX ORDER — HYDROMORPHONE HCL 110MG/55ML
PATIENT CONTROLLED ANALGESIA SYRINGE INTRAVENOUS PRN
Status: DISCONTINUED | OUTPATIENT
Start: 2020-10-26 | End: 2020-10-26 | Stop reason: SDUPTHER

## 2020-10-26 RX ORDER — NEOSTIGMINE METHYLSULFATE 1 MG/ML
INJECTION, SOLUTION INTRAVENOUS PRN
Status: DISCONTINUED | OUTPATIENT
Start: 2020-10-26 | End: 2020-10-26 | Stop reason: SDUPTHER

## 2020-10-26 RX ORDER — SODIUM CHLORIDE 0.9 % (FLUSH) 0.9 %
10 SYRINGE (ML) INJECTION EVERY 12 HOURS SCHEDULED
Status: DISCONTINUED | OUTPATIENT
Start: 2020-10-26 | End: 2020-10-26 | Stop reason: HOSPADM

## 2020-10-26 RX ORDER — MIDAZOLAM HYDROCHLORIDE 1 MG/ML
2 INJECTION INTRAMUSCULAR; INTRAVENOUS ONCE
Status: COMPLETED | OUTPATIENT
Start: 2020-10-26 | End: 2020-10-26

## 2020-10-26 RX ORDER — PROPOFOL 10 MG/ML
INJECTION, EMULSION INTRAVENOUS PRN
Status: DISCONTINUED | OUTPATIENT
Start: 2020-10-26 | End: 2020-10-26 | Stop reason: SDUPTHER

## 2020-10-26 RX ORDER — ONDANSETRON 2 MG/ML
INJECTION INTRAMUSCULAR; INTRAVENOUS PRN
Status: DISCONTINUED | OUTPATIENT
Start: 2020-10-26 | End: 2020-10-26 | Stop reason: SDUPTHER

## 2020-10-26 RX ADMIN — ROCURONIUM BROMIDE 10 MG: 10 INJECTION, SOLUTION INTRAVENOUS at 10:32

## 2020-10-26 RX ADMIN — ROCURONIUM BROMIDE 40 MG: 10 INJECTION, SOLUTION INTRAVENOUS at 09:03

## 2020-10-26 RX ADMIN — LABETALOL HYDROCHLORIDE 5 MG: 5 INJECTION INTRAVENOUS at 10:39

## 2020-10-26 RX ADMIN — Medication 3 MG: at 11:50

## 2020-10-26 RX ADMIN — PROPOFOL 110 MG: 10 INJECTION, EMULSION INTRAVENOUS at 09:03

## 2020-10-26 RX ADMIN — FENTANYL CITRATE 50 MCG: 50 INJECTION, SOLUTION INTRAMUSCULAR; INTRAVENOUS at 09:03

## 2020-10-26 RX ADMIN — FENTANYL CITRATE 50 MCG: 50 INJECTION, SOLUTION INTRAMUSCULAR; INTRAVENOUS at 08:42

## 2020-10-26 RX ADMIN — HYDROCODONE BITARTRATE AND ACETAMINOPHEN 1 TABLET: 5; 325 TABLET ORAL at 14:22

## 2020-10-26 RX ADMIN — SODIUM CHLORIDE: 9 INJECTION, SOLUTION INTRAVENOUS at 07:28

## 2020-10-26 RX ADMIN — DEXAMETHASONE SODIUM PHOSPHATE 10 MG: 10 INJECTION, SOLUTION INTRAMUSCULAR; INTRAVENOUS at 09:30

## 2020-10-26 RX ADMIN — FENTANYL CITRATE 50 MCG: 50 INJECTION, SOLUTION INTRAMUSCULAR; INTRAVENOUS at 09:44

## 2020-10-26 RX ADMIN — Medication 100 MCG: at 11:49

## 2020-10-26 RX ADMIN — MORPHINE SULFATE 1 MG: 2 INJECTION, SOLUTION INTRAMUSCULAR; INTRAVENOUS at 13:14

## 2020-10-26 RX ADMIN — ROPIVACAINE HYDROCHLORIDE 30 ML: 5 INJECTION EPIDURAL; INFILTRATION; PERINEURAL at 08:53

## 2020-10-26 RX ADMIN — MIDAZOLAM 1 MG: 1 INJECTION INTRAMUSCULAR; INTRAVENOUS at 08:42

## 2020-10-26 RX ADMIN — LIDOCAINE HYDROCHLORIDE 100 MG: 20 INJECTION, SOLUTION INTRAVENOUS at 09:03

## 2020-10-26 RX ADMIN — HYDROMORPHONE HYDROCHLORIDE 0.5 MG: 2 INJECTION, SOLUTION INTRAMUSCULAR; INTRAVENOUS; SUBCUTANEOUS at 11:20

## 2020-10-26 RX ADMIN — ONDANSETRON HYDROCHLORIDE 4 MG: 2 INJECTION, SOLUTION INTRAMUSCULAR; INTRAVENOUS at 09:30

## 2020-10-26 RX ADMIN — SODIUM CHLORIDE: 9 INJECTION, SOLUTION INTRAVENOUS at 08:57

## 2020-10-26 RX ADMIN — HYDRALAZINE HYDROCHLORIDE 5 MG: 20 INJECTION INTRAMUSCULAR; INTRAVENOUS at 10:07

## 2020-10-26 RX ADMIN — ROPIVACAINE HYDROCHLORIDE 30 ML: 5 INJECTION, SOLUTION EPIDURAL; INFILTRATION; PERINEURAL at 08:46

## 2020-10-26 RX ADMIN — ROCURONIUM BROMIDE 10 MG: 10 INJECTION, SOLUTION INTRAVENOUS at 09:47

## 2020-10-26 RX ADMIN — Medication 2 G: at 09:25

## 2020-10-26 RX ADMIN — Medication 0.6 MG: at 11:50

## 2020-10-26 RX ADMIN — SODIUM CHLORIDE: 9 INJECTION, SOLUTION INTRAVENOUS at 11:50

## 2020-10-26 ASSESSMENT — PULMONARY FUNCTION TESTS
PIF_VALUE: 13
PIF_VALUE: 22
PIF_VALUE: 21
PIF_VALUE: 13
PIF_VALUE: 13
PIF_VALUE: 23
PIF_VALUE: 23
PIF_VALUE: 21
PIF_VALUE: 23
PIF_VALUE: 20
PIF_VALUE: 14
PIF_VALUE: 21
PIF_VALUE: 21
PIF_VALUE: 25
PIF_VALUE: 21
PIF_VALUE: 23
PIF_VALUE: 23
PIF_VALUE: 21
PIF_VALUE: 23
PIF_VALUE: 21
PIF_VALUE: 22
PIF_VALUE: 20
PIF_VALUE: 22
PIF_VALUE: 1
PIF_VALUE: 25
PIF_VALUE: 3
PIF_VALUE: 10
PIF_VALUE: 22
PIF_VALUE: 13
PIF_VALUE: 16
PIF_VALUE: 4
PIF_VALUE: 21
PIF_VALUE: 14
PIF_VALUE: 22
PIF_VALUE: 20
PIF_VALUE: 21
PIF_VALUE: 21
PIF_VALUE: 22
PIF_VALUE: 16
PIF_VALUE: 21
PIF_VALUE: 21
PIF_VALUE: 12
PIF_VALUE: 1
PIF_VALUE: 22
PIF_VALUE: 1
PIF_VALUE: 19
PIF_VALUE: 12
PIF_VALUE: 15
PIF_VALUE: 22
PIF_VALUE: 23
PIF_VALUE: 21
PIF_VALUE: 15
PIF_VALUE: 21
PIF_VALUE: 23
PIF_VALUE: 22
PIF_VALUE: 22
PIF_VALUE: 0
PIF_VALUE: 21
PIF_VALUE: 21
PIF_VALUE: 22
PIF_VALUE: 21
PIF_VALUE: 18
PIF_VALUE: 22
PIF_VALUE: 21
PIF_VALUE: 20
PIF_VALUE: 22
PIF_VALUE: 13
PIF_VALUE: 22
PIF_VALUE: 20
PIF_VALUE: 20
PIF_VALUE: 13
PIF_VALUE: 21
PIF_VALUE: 21
PIF_VALUE: 17
PIF_VALUE: 22
PIF_VALUE: 23
PIF_VALUE: 17
PIF_VALUE: 15
PIF_VALUE: 21
PIF_VALUE: 23
PIF_VALUE: 23
PIF_VALUE: 16
PIF_VALUE: 19
PIF_VALUE: 4
PIF_VALUE: 1
PIF_VALUE: 22
PIF_VALUE: 15
PIF_VALUE: 17
PIF_VALUE: 21
PIF_VALUE: 17
PIF_VALUE: 16
PIF_VALUE: 23
PIF_VALUE: 22
PIF_VALUE: 23
PIF_VALUE: 21
PIF_VALUE: 15
PIF_VALUE: 21
PIF_VALUE: 3
PIF_VALUE: 22
PIF_VALUE: 21
PIF_VALUE: 23
PIF_VALUE: 21
PIF_VALUE: 13
PIF_VALUE: 13
PIF_VALUE: 21
PIF_VALUE: 10
PIF_VALUE: 12
PIF_VALUE: 20
PIF_VALUE: 21
PIF_VALUE: 21
PIF_VALUE: 22
PIF_VALUE: 2
PIF_VALUE: 20
PIF_VALUE: 21
PIF_VALUE: 20
PIF_VALUE: 22
PIF_VALUE: 23
PIF_VALUE: 20
PIF_VALUE: 21
PIF_VALUE: 20
PIF_VALUE: 1
PIF_VALUE: 22
PIF_VALUE: 13
PIF_VALUE: 21
PIF_VALUE: 23
PIF_VALUE: 16
PIF_VALUE: 24
PIF_VALUE: 19
PIF_VALUE: 10
PIF_VALUE: 21
PIF_VALUE: 21
PIF_VALUE: 20
PIF_VALUE: 13
PIF_VALUE: 23
PIF_VALUE: 13
PIF_VALUE: 22
PIF_VALUE: 3
PIF_VALUE: 21
PIF_VALUE: 23
PIF_VALUE: 22
PIF_VALUE: 20
PIF_VALUE: 21
PIF_VALUE: 21
PIF_VALUE: 19
PIF_VALUE: 12
PIF_VALUE: 23
PIF_VALUE: 13
PIF_VALUE: 21
PIF_VALUE: 22
PIF_VALUE: 21
PIF_VALUE: 24
PIF_VALUE: 22
PIF_VALUE: 13
PIF_VALUE: 23
PIF_VALUE: 15
PIF_VALUE: 21
PIF_VALUE: 13
PIF_VALUE: 1
PIF_VALUE: 21
PIF_VALUE: 21
PIF_VALUE: 22
PIF_VALUE: 3
PIF_VALUE: 21
PIF_VALUE: 21
PIF_VALUE: 20
PIF_VALUE: 21
PIF_VALUE: 23
PIF_VALUE: 20
PIF_VALUE: 23
PIF_VALUE: 18
PIF_VALUE: 21
PIF_VALUE: 12
PIF_VALUE: 22
PIF_VALUE: 6
PIF_VALUE: 21

## 2020-10-26 ASSESSMENT — PAIN DESCRIPTION - ONSET
ONSET: ON-GOING

## 2020-10-26 ASSESSMENT — PAIN DESCRIPTION - LOCATION
LOCATION: ARM

## 2020-10-26 ASSESSMENT — PAIN DESCRIPTION - PAIN TYPE
TYPE: SURGICAL PAIN

## 2020-10-26 ASSESSMENT — PAIN SCALES - GENERAL
PAINLEVEL_OUTOF10: 0
PAINLEVEL_OUTOF10: 4
PAINLEVEL_OUTOF10: 0
PAINLEVEL_OUTOF10: 5
PAINLEVEL_OUTOF10: 0
PAINLEVEL_OUTOF10: 4
PAINLEVEL_OUTOF10: 0
PAINLEVEL_OUTOF10: 3
PAINLEVEL_OUTOF10: 5
PAINLEVEL_OUTOF10: 4

## 2020-10-26 ASSESSMENT — PAIN DESCRIPTION - PROGRESSION
CLINICAL_PROGRESSION: GRADUALLY IMPROVING
CLINICAL_PROGRESSION: GRADUALLY WORSENING
CLINICAL_PROGRESSION: GRADUALLY IMPROVING
CLINICAL_PROGRESSION: GRADUALLY WORSENING

## 2020-10-26 ASSESSMENT — PAIN DESCRIPTION - ORIENTATION
ORIENTATION: RIGHT

## 2020-10-26 ASSESSMENT — PAIN DESCRIPTION - DESCRIPTORS
DESCRIPTORS: DISCOMFORT;SORE

## 2020-10-26 ASSESSMENT — PAIN - FUNCTIONAL ASSESSMENT: PAIN_FUNCTIONAL_ASSESSMENT: 0-10

## 2020-10-26 NOTE — ANESTHESIA PRE PROCEDURE
Substance Use Topics    Alcohol use: Never     Frequency: Never                                Counseling given: Not Answered      Vital Signs (Current):   Vitals:    10/21/20 1407 10/26/20 0651   BP:  (!) 196/78   Pulse:  86   Resp:  20   Temp:  97.4 °F (36.3 °C)   TempSrc:  Temporal   SpO2:  96%   Weight: 170 lb (77.1 kg) 170 lb (77.1 kg)   Height: 4' 10\" (1.473 m) 4' 10\" (1.473 m)                                              BP Readings from Last 3 Encounters:   10/26/20 (!) 196/78   10/23/20 106/60   10/20/20 (!) 148/89       NPO Status: Time of last liquid consumption: 2200                        Time of last solid consumption: 2200                        Date of last liquid consumption: 10/25/20                        Date of last solid food consumption: 10/25/20    BMI:   Wt Readings from Last 3 Encounters:   10/26/20 170 lb (77.1 kg)   10/23/20 174 lb (78.9 kg)   10/20/20 170 lb (77.1 kg)     Body mass index is 35.53 kg/m². CBC:   Lab Results   Component Value Date    WBC 6.1 10/26/2020    RBC 3.72 10/26/2020    HGB 11.3 10/26/2020    HCT 35.9 10/26/2020    MCV 96.5 10/26/2020    RDW 15.7 10/26/2020     10/26/2020       CMP:   Lab Results   Component Value Date     10/26/2020    K 3.9 10/26/2020     10/26/2020    CO2 25 10/26/2020    BUN 10 10/26/2020    CREATININE 0.8 10/26/2020    GFRAA >60 10/26/2020    LABGLOM >60 10/26/2020    GLUCOSE 99 10/26/2020    PROT 7.1 10/26/2020    CALCIUM 8.5 10/26/2020    BILITOT 1.0 10/26/2020    ALKPHOS 82 10/26/2020    AST 17 10/26/2020    ALT 7 10/26/2020       POC Tests: No results for input(s): POCGLU, POCNA, POCK, POCCL, POCBUN, POCHEMO, POCHCT in the last 72 hours.     Coags: No results found for: PROTIME, INR, APTT    HCG (If Applicable): No results found for: PREGTESTUR, PREGSERUM, HCG, HCGQUANT     ABGs: No results found for: PHART, PO2ART, OPB9VWS, KBE3IVI, BEART, [id number]     Type & Screen (If Applicable):  No results found for: LABABO,

## 2020-10-26 NOTE — ANESTHESIA POSTPROCEDURE EVALUATION
Department of Anesthesiology  Postprocedure Note    Patient: Doug Felder  MRN: [id number]  YOB: 1940  Date of evaluation: 10/26/2020  Time:  1:50 PM     Procedure Summary     Date:  10/26/20 Room / Location:  [facility] OR 08 / [facility]    Anesthesia Start:   Anesthesia Stop:      Procedure:  RIGHT DISTAL HUMERUS  OPEN REDUCTION INTERNAL FIXATION -- SYNTHES (Right ) Diagnosis:  (RIGHT SUPRACONDYLAR DISTAL HUMERUS FRACTURE)    Surgeon:  Carlos Jackson MD Responsible Provider:      Anesthesia Type:  general, regional ASA Status:  1          Anesthesia Type: general, regional    Junior Phase I: Junior Score: 9    Junior Phase II:      Last vitals: Reviewed and per EMR flowsheets.        Anesthesia Post Evaluation    Patient location during evaluation: PACU  Patient participation: complete - patient participated  Level of consciousness: awake  Pain score: 3  Airway patency: patent  Nausea & Vomiting: no nausea and no vomiting  Complications: no  Cardiovascular status: blood pressure returned to baseline  Respiratory status: acceptable  Hydration status: euvolemic
No

## 2020-10-26 NOTE — INTERVAL H&P NOTE
Update History & Physical    The patient's History and Physical of October 15, 2020 was reviewed with the patient and I examined the patient. There was no change. The surgical site was confirmed by the patient and me. Plan: The risks, benefits, expected outcome, and alternative to the recommended procedure have been discussed with the patient. Patient understands and wants to proceed with the procedure.      Electronically signed by Jesus Smith MD on 10/26/2020 at 7:45 AM

## 2020-10-26 NOTE — OP NOTE
Implanted   SCREW VARI-ANGL LK 2.7X40MM Screw/Plate/Nail/Freddie SCREW VARI-ANGL LK 2.7X40MM  SYNTHES  Right 1 Implanted   SCREW VARI-ANGL LK 2.7X42MM Screw/Plate/Nail/Freddie SCREW VARI-ANGL LK 2.7X42MM  SYNTHES  Right 1 Implanted   SCREW VARI-ANGL LK 2.7X50MM Screw/Plate/Nail/Freddie SCREW VARI-ANGL LK 2.7X50MM  SYNTHES  Right 1 Implanted         Drains: * No LDAs found *    Findings: Extremely poor bone quality. Difficulty getting reduction due to bone disintegrating. Detailed Description of Procedure:   Patient was brought to the operating room in a supine position on a hospital room bed. Patient was transferred to the operating room table by multiple individuals in a safe fashion with anesthesia and control the patient C-spine and airway. Once in the operating room table patient was placed in a left side down right side up. All points of pressure were identified and well-padded. An axillary roll was placed. Patient had a right arm placed over a post.  Her right upper extremity was sterilely prepped and draped in the standard orthopedic fashion. A timeout was performed indicating the appropriate identification of the patient, the procedure to be performed, and the side to be performed upon. This was agreed upon by all individuals in the room. A sterile tourniquet was applied. An Esmarch was utilized and tourniquet was elevated to 250 mmHg. A posterior approach to the distal humerus was marked out over the arm. A curved laterally around the olecranon. A 10 blade used to make an incision. Careful dissection was carried out through the triceps fascia. After he made medial lateral skin flaps the ulnar nerve was dissected out with scissors carefully and a vessel loop was placed. The distal fracture fragments were then identified. The bone quality was extremely poor any K wire cut directly through the bone.   We were able to get a reasonable reduction and a medial lateral plate were pinned into position for provisional fixation. Proximally we obtain 2 good bicortical screws. Distally we obtained screws that went across the joint from the medial to lateral column and from the lateral to the medial column. These were locked into position. Final x-rays showed reasonable reduction with some extension deformity. This was attempted to be corrected multiple times throughout the case but again because of the poor bone quality of the reduction forceps and K wires continue to cut to the bone. In light of not destroying the bone it was fixed and locked in the best position the elbow did go through range of motion with the distal piece being stable. There was no grinding or indication anything was in the joint. We are were able to feel most of the joint anteriorly. Point time was tariq irrigated with sterile saline. The triceps fascia was closed with 0 Vicryl subtenons tissue with 2-0 Monocryl and skin with 3-0 nylon's. Patient had a dressing put in position as well as a posterior splint. She was taken to the PACU in stable condition.       Postoperative plan:  Splint for 2 weeks and then start gentle range of motion with the right elbow    Pain control    Follow-up in the office in 2 weeks for suture removal in initiation of occupational therapy for gentle range of motion    Electronically signed by Mahsa Allison MD on 10/26/2020 at 11:31 AM

## 2020-10-26 NOTE — PROGRESS NOTES
Discharge instructions given with family at the bedside.  Pt and family verbalized understanding of discharge instructions

## 2020-10-26 NOTE — PROGRESS NOTES
Covid testing done  Results negative  Self quarantine guidelines have been maintained  No unusual signs or symptoms to report  Screening questionnaire completedPovidine-iodine 5% nasal antiseptic pre-op prep completed 15 seconds per nare and repeated. 2% CHG pre-op skin prep completed in appropriate order using all 6 cloths:    With 1st cloth, wipe the neck, chest, and abdomen. Scrub inside nd  around the navel/belly-button area.  With 2nd cloth, wipe both arms, starting each with the shoulder  and ending at the fingertips. Be sure to thoroughly wipe the arm  pit area.  With 3rd cloth, wipe the right and left hip followed by the groin. Be sure to wipe folds in the abdominal and groin areas.  With 4th cloth, wipe both legs, starting at the thigh and ending   at the toes. Be sure to thoroughly wipe behind the knees.  With 5th cloth, wipe the back starting at the base of the neck and   Ending at the waist line.  With 6th cloth, wipe the buttocks.

## 2020-11-09 ENCOUNTER — HOSPITAL ENCOUNTER (OUTPATIENT)
Dept: GENERAL RADIOLOGY | Age: 80
Discharge: HOME | End: 2020-11-11
Payer: MEDICARE

## 2020-11-09 ENCOUNTER — OFFICE VISIT (OUTPATIENT)
Dept: ORTHOPEDIC SURGERY | Age: 80
End: 2020-11-09
Payer: MEDICARE

## 2020-11-09 VITALS
TEMPERATURE: 97.9 F | WEIGHT: 165 LBS | BODY MASS INDEX: 33.26 KG/M2 | SYSTOLIC BLOOD PRESSURE: 183 MMHG | DIASTOLIC BLOOD PRESSURE: 97 MMHG | HEART RATE: 64 BPM | HEIGHT: 59 IN

## 2020-11-09 PROCEDURE — 2709999900 HC NON-CHARGEABLE SUPPLY: Performed by: PHYSICIAN ASSISTANT

## 2020-11-09 PROCEDURE — 99212 OFFICE O/P EST SF 10 MIN: CPT | Performed by: PHYSICIAN ASSISTANT

## 2020-11-09 PROCEDURE — 99024 POSTOP FOLLOW-UP VISIT: CPT | Performed by: PHYSICIAN ASSISTANT

## 2020-11-09 PROCEDURE — 73080 X-RAY EXAM OF ELBOW: CPT

## 2020-11-09 RX ORDER — HYDROCODONE BITARTRATE AND ACETAMINOPHEN 5; 325 MG/1; MG/1
1 TABLET ORAL 2 TIMES DAILY PRN
Qty: 14 TABLET | Refills: 0 | Status: SHIPPED | OUTPATIENT
Start: 2020-11-09 | End: 2020-11-16

## 2020-11-09 RX ORDER — HYDROCODONE BITARTRATE AND ACETAMINOPHEN 5; 325 MG/1; MG/1
1 TABLET ORAL EVERY 6 HOURS PRN
COMMUNITY
End: 2020-11-09 | Stop reason: ALTCHOICE

## 2020-11-09 NOTE — PATIENT INSTRUCTIONS
Greta Graham     [address] [facility], [address] [place] [address]  [phone]

## 2020-11-09 NOTE — PROGRESS NOTES
Chief Complaint   Patient presents with    Fracture      ORIF Rt humerus 10-26 post fall. Splint and ace wrap dry and intact . Reports mild to moderate pain. OP:SURGEON: Dr. Hannah Sims MD  DATE OF PROCEDURE: 10/26/2020  PROCEDURE: Open reduction internal fixation right supracondylar humerus fracture with intra-articular extension    Subjective: Jovanny José is approximately 2 weeks follow-up from the above surgery. Patient is NWB on that extremity. Maintained splint with no issues. Taking Norco for night time pain relief only at this point and states her current pain is mild-moderate. States transportation is an issue and would have difficulty going to orthotics to be fitted for hinged ROM elbow brace. Also states she is not sure if insurance will cover OT and is apprehensive about this as well. Denies paresthesias, myalgias, fever, chills. Review of Systems -    General ROS: negative for - chills, fatigue, fever or night sweats  Respiratory ROS: no cough, shortness of breath, or wheezing  Cardiovascular ROS: no chest pain or dyspnea on exertion  Gastrointestinal ROS: no abdominal pain, nausea, vomiting, diarrhea, constipation,or black or bloody stools  Genitourinary: no hematuria, dysuria, or incontinence   Musculoskeletal ROS: negative for -back or neck pain or stiffness, also see HPI  Neurological ROS: no TIA or stroke symptoms       Objective:    General: Alert and oriented X 3, normocephalic atraumatic, external ears and eye normal, sclera clear, no acute distress, respirations easy and unlabored with no audible wheezes, skin warm and dry, speech and dress appropriate for noted age, affect euthymic.     Extremity:  Right Upper Extremity  Skin is clean dry and intact  Moderate edema noted about the elbow, distally down the forearm and about the R hand with healing ecchymosis   Radial pulse palpable, fingers warm with BCR  Flex/extension intact to wrist, thumb and fingers, somewhat limited due to stiffness and pain, but intact   Finger opposition intact  Finger adduction/abduction intact  Finger crossover intact  Subjectively states sensation intact to radial/medial/ulnar distribution  Incision well approximated with no redness, drainage or warmth, sutures intact      BP (!) 183/97   Pulse 64   Temp 97.9 °F (36.6 °C)   Ht 4' 11\" (1.499 m)   Wt 165 lb (74.8 kg)   BMI 33.33 kg/m²     XR:   Multiple views of R elbow demonstrating distal humerus fracture with no interval displacement and no appreciable healing at this time. Hardware remains intact without displacement. No new fractures or dislocations noted. Assessment:   Diagnosis Orders   1. Other closed displaced fracture of distal end of right humerus, initial encounter         Plan:   Reviewed x-rays with patient today in office    Removed sutures from surgical incision line. Steri strips were placed. Patient tolerated procedure well with minimal pain. No Soaking or submerging incision in water until skin is fully healed.  WB:  Non-weight bearing - Hinged elbow ROM brace order placed through [facility]   Elbow Hinged ROM Brace: On at all times, can remove for hygiene and therapy   Therapy: Attend therapy following the Distal Humerus protocol at the 2 week joanne  - order placed today   Refilled Norco today BID x7 days PRN     Discussed that the combination of opioids, benzodiazepines, sleep aids, alcohol, and/or illicit drugs will cause increased drowsiness, sedation, and dizziness, and may lead to respiratory depression and even death. Instructed the patient not to operate heavy machinery, including driving, while taking any of the above medications. Discussed the importance of obtaining these medications from only one provider as well as taking these medications only as prescribed. Patient verbalizes understanding.      Controlled Substance Monitoring:    Acute and Chronic Pain Monitoring:   RX Monitoring 11/9/2020   Periodic Controlled Substance Monitoring No signs of potential drug abuse or diversion identified. Follow up in 4 weeks with XR of the R elbow    Electronically signed by Theodora Salamanca PA-C on 11/9/2020 at 9:22 AM  Note: This report was completed using computerBaeta voiced recognition software. Every effort has been made to ensure accuracy; however, inadvertent computerized transcription errors may be present.

## 2020-11-24 PROBLEM — S42.401D CLOSED FRACTURE OF DISTAL END OF RIGHT HUMERUS WITH ROUTINE HEALING: Status: ACTIVE | Noted: 2020-11-24

## 2020-12-07 ENCOUNTER — OFFICE VISIT (OUTPATIENT)
Dept: ORTHOPEDIC SURGERY | Age: 80
End: 2020-12-07
Payer: MEDICARE

## 2020-12-07 ENCOUNTER — HOSPITAL ENCOUNTER (OUTPATIENT)
Dept: GENERAL RADIOLOGY | Age: 80
Discharge: HOME | End: 2020-12-09
Payer: MEDICARE

## 2020-12-07 VITALS — DIASTOLIC BLOOD PRESSURE: 87 MMHG | HEART RATE: 97 BPM | TEMPERATURE: 97.6 F | SYSTOLIC BLOOD PRESSURE: 141 MMHG

## 2020-12-07 PROCEDURE — 73080 X-RAY EXAM OF ELBOW: CPT

## 2020-12-07 PROCEDURE — 99212 OFFICE O/P EST SF 10 MIN: CPT | Performed by: PHYSICIAN ASSISTANT

## 2020-12-07 PROCEDURE — 99024 POSTOP FOLLOW-UP VISIT: CPT | Performed by: PHYSICIAN ASSISTANT

## 2020-12-07 RX ORDER — ACETAMINOPHEN 325 MG/1
650 TABLET ORAL EVERY 6 HOURS PRN
COMMUNITY

## 2020-12-07 RX ORDER — HYDROCODONE BITARTRATE AND ACETAMINOPHEN 5; 325 MG/1; MG/1
1 TABLET ORAL 2 TIMES DAILY PRN
COMMUNITY

## 2020-12-08 NOTE — PROGRESS NOTES
Chief Complaint   Patient presents with    Follow Up After Procedure     R distal humerus ORIF 10/26/20. wearing hinged ROM brace with sling attachment. complains of occasional soreness. not in therapy due to insurance reasons. OP:SURGEON: Dr. Fior Hardy MD  DATE OF PROCEDURE: 10/26/2020  PROCEDURE: Open reduction internal fixation right supracondylar humerus fracture with intra-articular extension    Subjective: Tameka Villatoro is approximately 6 weeks follow-up from the above surgery. Patient is NWB on that extremity. Patient states just recently she got her hinged ROM brace. Patient using Tylenol PRN for pain. Has not been icing and elevating recently. Patient has not started therapy secondary to significant cost of copay, asking if she can do a HEP. Patient does feel like her hand stiffness is more of an issue than the elbow at this time. Denies paresthesias, myalgias, fever, chills. Denies any other orthopedic complaints. Review of Systems -    General ROS: negative for - chills, fatigue, fever or night sweats  Respiratory ROS: no cough, shortness of breath, or wheezing  Cardiovascular ROS: no chest pain or dyspnea on exertion  Gastrointestinal ROS: no abdominal pain, nausea, vomiting, diarrhea, constipation,or black or bloody stools  Genitourinary: no hematuria, dysuria, or incontinence   Musculoskeletal ROS: negative for -back or neck pain or stiffness, also see HPI  Neurological ROS: no TIA or stroke symptoms       Objective:    General: Alert and oriented X 3, normocephalic atraumatic, external ears and eye normal, sclera clear, no acute distress, respirations easy and unlabored with no audible wheezes, skin warm and dry, speech and dress appropriate for noted age, affect euthymic.     Extremity:  Right Upper Extremity  Skin is clean dry and intact  Moderate edema noted about the elbow  Ecchymosis resolved  Persistent edema noted the digits  Radial pulse palpable, fingers warm with BCR  Flex/extension intact to wrist, thumb and fingers, somewhat limited due to stiffness and pain, but intact. Patient unable to make full fist  Finger opposition intact  Finger adduction/abduction intact  Finger crossover intact  Subjectively states sensation intact to radial/medial/ulnar distribution  Incision well healing with no redness, drainage or warmth  PROM of the elbow       BP (!) 141/87   Pulse 97   Temp 97.6 °F (36.4 °C) (Oral)     XR:   Multiple views of R elbow demonstrating distal humerus fracture with no interval displacement of fracture, no evidence of hardware failure. Notable soft tissue edema. No significant healing noted    Assessment:   Diagnosis Orders   1. Other closed displaced fracture of distal end of right humerus with routine healing, subsequent encounter         Plan:  Continue nonweightbearing on right upper extremity  Continue with brace, increase 'extension' weekly, going towards smaller numbers  Start working on elbow, wrist and hand motion every day  Home exercises provided today    [place] for compression  Continue with ice and elevation for swelling    Goal is to increase elbow motion every week    OK to use heat before range of motion exercises    Please call or send [employer] message with any questions or concerns      Electronically signed by Amada Jackson PA-C on 12/8/2020 at 6:31 PM  Note: This report was completed using computerClearwave voiced recognition software. Every effort has been made to ensure accuracy; however, inadvertent computerized transcription errors may be present.

## 2021-01-20 ENCOUNTER — HOSPITAL ENCOUNTER (OUTPATIENT)
Dept: GENERAL RADIOLOGY | Age: 81
Discharge: HOME | End: 2021-01-22
Payer: MEDICARE

## 2021-01-20 ENCOUNTER — OFFICE VISIT (OUTPATIENT)
Dept: ORTHOPEDIC SURGERY | Age: 81
End: 2021-01-20
Payer: MEDICARE

## 2021-01-20 VITALS — TEMPERATURE: 98.1 F

## 2021-01-20 DIAGNOSIS — S42.491D OTHER CLOSED DISPLACED FRACTURE OF DISTAL END OF RIGHT HUMERUS WITH ROUTINE HEALING, SUBSEQUENT ENCOUNTER: ICD-10-CM

## 2021-01-20 DIAGNOSIS — S42.481D: Primary | ICD-10-CM

## 2021-01-20 PROCEDURE — 99212 OFFICE O/P EST SF 10 MIN: CPT

## 2021-01-20 PROCEDURE — 99024 POSTOP FOLLOW-UP VISIT: CPT | Performed by: ORTHOPAEDIC SURGERY

## 2021-01-20 PROCEDURE — 73080 X-RAY EXAM OF ELBOW: CPT

## 2021-01-20 NOTE — PROGRESS NOTES
Chief Complaint   Patient presents with    Arm Injury     RIGHT DISTAL HUMERUS  OPEN REDUCTION INTERNAL FIXATION -- SYNTHES dos: 10/26/20; patient is still wearing her brace; 0/10 pain    Other     patients daughter is saying patient is non compliant with brace       SUBJECTIVE: Patient is here about 10 weeks out from open reduction internal fixation of her right distal humerus fracture. She is done very well. She has little to no pain. She does have about 30 degrees lacking extension she is flexing fully. She lacks about 5 degrees of full pronation and has full supination. She is happy with the result. She has not been attending therapy due to the fact that her insurance will cover it, nor does she want to go at this point. She would like to see if she can get her brace off and she also denies any further falls. Review of Systems -   General ROS: negative for - chills, fatigue, fever or night sweats  Respiratory ROS: no cough, shortness of breath, or wheezing  Cardiovascular ROS: no chest pain or dyspnea on exertion  Gastrointestinal ROS: no abdominal pain, change in bowel habits, or black or bloody stools  Genitourinary: no hematuria, dysuria, or incontinence   Musculoskeletal ROS:see above  Neurological ROS: no TIA or stroke symptoms       OBJECTIVE:   Alert and oriented X 3, no acute distress, respirations easy and unlabored with no audible wheezes, skin warm and dry, speech and dress appropriate for noted age, affect euthymic.     Extremity:  Incision well-healed  Compartment soft compressible  There is no tenderness to palpation  There is no crepitus on range of motion  Range of motion is 40 to 115 degrees  She is neurovascular intact distally  Two-point discrimination is intact in all digits  Cap refills of 3 seconds  Radial pulses 2/4  She does have some  weakness compared to her left hand XR: 1/20/21 x-ray shows a small void in the anterior portion of fracture although, the posterior portion is fully healed. The hardware is in good position, there appears to be no screws violating the joint. Temp 98.1 °F (36.7 °C)     ASSESSMENT:     Diagnosis Orders   1.  Closed torus fracture of distal end of right humerus with routine healing, subsequent encounter         PLAN:  Brace can be off as able    May work on range of motion    Activity as tolerated with care    Follow-up in 8 weeks, call with any questions or concerns    ELECTRONICALLY signed by:    Qasim Darden MD  1/20/21

## 2021-01-20 NOTE — PATIENT INSTRUCTIONS
Brace can be off as able    May work on range of motion    Activity as tolerated with care    Follow-up in 8 weeks, call with any questions or concerns

## 2021-03-12 DIAGNOSIS — S42.481D: Primary | ICD-10-CM

## 2021-03-17 ENCOUNTER — HOSPITAL ENCOUNTER (OUTPATIENT)
Dept: GENERAL RADIOLOGY | Age: 81
Discharge: HOME | End: 2021-03-19
Payer: MEDICARE

## 2021-03-17 ENCOUNTER — OFFICE VISIT (OUTPATIENT)
Dept: ORTHOPEDIC SURGERY | Age: 81
End: 2021-03-17
Payer: MEDICARE

## 2021-03-17 VITALS — TEMPERATURE: 97.2 F

## 2021-03-17 DIAGNOSIS — S42.481D: Primary | ICD-10-CM

## 2021-03-17 DIAGNOSIS — S42.481D: ICD-10-CM

## 2021-03-17 PROCEDURE — 99212 OFFICE O/P EST SF 10 MIN: CPT

## 2021-03-17 PROCEDURE — 73080 X-RAY EXAM OF ELBOW: CPT

## 2021-03-17 PROCEDURE — 99213 OFFICE O/P EST LOW 20 MIN: CPT | Performed by: PHYSICIAN ASSISTANT

## 2021-03-17 NOTE — PROGRESS NOTES
Chief Complaint   Patient presents with    Follow-up     R distal humerus ORIF 10/26/20       OP:SURGEON: Dr. Nilsa Maria MD  DATE OF PROCEDURE: 10/26/2020  PROCEDURE: Open reduction internal fixation right supracondylar humerus fracture with intra-articular extension    Subjective: Kyle Galdamez is approximately 4.5 months the above date of surgery. She has been weightbearing as tolerated the right upper extremity without use of her hinged elbow brace. States that her range of motion is still very limited but due to insurance/financial reasons has not been to occupational therapy. She is very active at home and try to do home exercises with stretch bands however states that her limited range at her right elbow does limit some of her ADLs. Also states that her  strength to her right hand is diminished compared to contralateral hand and she has trouble grasping and carrying objects. Denies paresthesias. Denies history of carpal tunnel. Is that previous to her injury she had no problems with her right hand. Denies any new injuries or any other orthopedic complaints. States that she has very little to no pain about the right elbow or wrist or hand. Overall states that her ROM has improved compared to her last OV and besides some limitation to elbow flexion and weaker  strength, is happy with her progress.      Review of Systems -    General ROS: negative for - chills, fatigue, fever or night sweats  Respiratory ROS: no cough, shortness of breath, or wheezing  Cardiovascular ROS: no chest pain or dyspnea on exertion  Gastrointestinal ROS: no abdominal pain, nausea, vomiting, diarrhea, constipation,or black or bloody stools  Genitourinary: no hematuria, dysuria, or incontinence   Musculoskeletal ROS: negative for -back or neck pain or stiffness, also see HPI  Neurological ROS: no TIA or stroke symptoms       Objective:    General: Alert and oriented X 3, normocephalic atraumatic, external ears and eye normal, sclera clear, no acute distress, respirations easy and unlabored with no audible wheezes, skin warm and dry, speech and dress appropriate for noted age, affect euthymic. Extremity:  Right Upper Extremity  Skin is clean dry and intact  No edema noted  Radial pulse palpable, fingers warm with BCR  Flex/extension intact to wrist, thumb and fingers  Finger opposition intact  Finger adduction/abduction intact  Finger crossover intact  Subjectively states sensation intact to radial/medial/ulnar distribution  Incision well approximated with no redness, drainage or warmth, suture intact  AROM R elbow , passively cannot get much more than this   Trace TTP to the posterior olecranon  nontender about the wrist and hand    strength 4+/5, contralateral  strength 5/5  Tinels and phalens -       Temp 97.2 °F (36.2 °C)     XR:   2 views of R elbow demonstrating healed distal humerus fx without change to the anterior portion where the void of osseus structure remains the same as previous imaging. Hardware remains intact without interval displacement, loosening, or failure. No acute fractures or dislocations or any other osseus abnormality identified. Assessment:   Diagnosis Orders   1. Closed torus fracture of distal end of right humerus with routine healing, subsequent encounter         Plan:   Reviewed x-rays with patient today in office    WBAT R UE   Aggressive HEP for ROM and strengthening - patient declining formal outpatient therapy due to financial/insurance reasons   otc analgesics if needed, ice, compression   Declining further surgeries or any other invasive procedures. Will continue working on aggressive HEP and call the office if sxs worsen or change for a f/u visit       Follow up PRN    Electronically signed by Damaso Quintanilla PA-C on 3/17/2021 at 11:18 AM  Note: This report was completed using [employer] voiced recognition software.   Every effort has been made to ensure accuracy; however, inadvertent computerized transcription errors may be present.

## 2021-03-17 NOTE — PROGRESS NOTES
R distal humerus ORIF on 10/26/20, no longer wearing brace, c/o stiffness in elbow and fingers states she is not able to  or hold things with R hand and not able to lift her arm

## 2022-02-15 ENCOUNTER — OFFICE VISIT (OUTPATIENT)
Dept: CARDIOLOGY CLINIC | Age: 82
End: 2022-02-15
Payer: MEDICARE

## 2022-02-15 VITALS
RESPIRATION RATE: 16 BRPM | WEIGHT: 147.3 LBS | SYSTOLIC BLOOD PRESSURE: 126 MMHG | DIASTOLIC BLOOD PRESSURE: 64 MMHG | HEART RATE: 109 BPM | HEIGHT: 59 IN | BODY MASS INDEX: 29.69 KG/M2

## 2022-02-15 DIAGNOSIS — Z01.818 PRE-OP EXAM: Primary | ICD-10-CM

## 2022-02-15 PROCEDURE — 99214 OFFICE O/P EST MOD 30 MIN: CPT | Performed by: INTERNAL MEDICINE

## 2022-02-15 PROCEDURE — 93000 ELECTROCARDIOGRAM COMPLETE: CPT | Performed by: INTERNAL MEDICINE

## 2022-02-15 NOTE — PROGRESS NOTES
OUTPATIENT CARDIOLOGY FOLLOW-UP    Name: Mary Beth Zaidi    Age: 80 y.o. Primary Care Physician: Karen Taylor DO    Date of Service: 2/15/2022    Chief Complaint:   Chief Complaint   Patient presents with    Cardiac Clearance     total mastectomy        Interim History:   Here for preoperative cardiac evaluation. I saw her once in 10/2020 for preoperative exam for elbow surgery. She is now scheduled for mastectomy for apparently recently diagnosed breast cancer, there is question of involvement of the lung, this is being done hermitage. She has no prior cardiac history. She had good functional capacity up until about 5 weeks ago, now not very active and has been mostly staying in bed. Denies chest pain or shortness of breath. Review of Systems:   Negative except as described above    Past Medical History:  History reviewed. No pertinent past medical history. Past Surgical History:  Past Surgical History:   Procedure Laterality Date    ELBOW SURGERY Right 10/26/2020    RIGHT DISTAL HUMERUS  OPEN REDUCTION INTERNAL FIXATION -- SYNTHES performed by Perez Dee MD at [address] [place] [address], Dr Anita Millard       Family History:  History reviewed. No pertinent family history.     Social History:  Social History     Tobacco Use    Smoking status: Never Smoker    Smokeless tobacco: Never Used   Vaping Use    Vaping Use: Never used   Substance Use Topics    Alcohol use: Never    Drug use: Never        Allergies:  No Known Allergies    Current Medications:    Current Outpatient Medications:     acetaminophen (TYLENOL) 325 MG tablet, Take 650 mg by mouth every 6 hours as needed for Pain, Disp: , Rfl:     HYDROcodone-acetaminophen (NORCO) 5-325 MG per tablet, Take 1 tablet by mouth 2 times daily as needed for Pain. , Disp: , Rfl:     aspirin EC 81 MG EC tablet, Take 1 tablet by mouth daily for 28 days (Patient not taking: Reported on 11/9/2020), Disp: 28 tablet, Rfl: 0    Physical Exam:  /64   Pulse 109   Resp 16   Ht 4' 11\" (1.499 m)   Wt 147 lb 4.8 oz (66.8 kg)   BMI 29.75 kg/m²   Wt Readings from Last 3 Encounters:   02/15/22 147 lb 4.8 oz (66.8 kg)   11/09/20 165 lb (74.8 kg)   10/26/20 170 lb (77.1 kg)     Appearance: Elderly female, awake, alert and oriented x 3, no acute respiratory distress  Skin: Intact, no rash  Head: Normocephalic, atraumatic  Eyes: EOMI, no conjunctival erythema  ENMT: No pharyngeal erythema, MMM, no rhinorrhea  Neck: Supple, no elevated JVP, no carotid bruits  Lungs: Clear to auscultation bilaterally. No wheezes, rales, or rhonchi. Cardiac: Regular rate and rhythm, +S1S2, no murmurs apparent  Abdomen: Soft, nontender, +bowel sounds  Extremities: Moves all extremities x 4, no lower extremity edema  Neurologic: No focal motor deficits apparent, normal mood and affect, alert and oriented x 3  Peripheral Pulses: Intact posterior tibial pulses bilaterally    Laboratory Tests:  Lab Results   Component Value Date    CREATININE 0.8 10/26/2020    BUN 10 10/26/2020     10/26/2020    K 3.9 10/26/2020     10/26/2020    CO2 25 10/26/2020     No results found for: MG  Lab Results   Component Value Date    WBC 6.1 10/26/2020    HGB 11.3 (L) 10/26/2020    HCT 35.9 10/26/2020    MCV 96.5 10/26/2020     10/26/2020     Lab Results   Component Value Date    ALT 7 10/26/2020    AST 17 10/26/2020    ALKPHOS 82 10/26/2020    BILITOT 1.0 10/26/2020     No results found for: CKTOTAL, CKMB, CKMBINDEX, TROPONINI  No results found for: INR, PROTIME  No results found for: TSHFT4, TSH  No results found for: LABA1C  No results found for: EAG  No results found for: CHOL  No results found for: TRIG  No results found for: HDL  No results found for: LDLCALC, LDLCHOLESTEROL  No results found for: LABVLDL, VLDL  No results found for: CHOLHDLRATIO  No results for input(s): PROBNP in the last 72 hours.     Cardiac Tests:  ECG: 2/15/2022: Sinus tachycardia 109 beats minute with first-degree AV block and frequent PACs in a pattern of bigeminy. Leftward axis, normal intervals. Possible LVH. Echocardiogram:     Stress test:      Cardiac catheterization:     Orders Placed This Encounter   Procedures    EKG 12 lead        Requested Prescriptions      No prescriptions requested or ordered in this encounter        ASSESSMENT / PLAN:  1. Preoperative cardiac evaluation for mastectomy  2. First-degree AV block  3. History of elbow surgery  4. History of right hip fracture with surgical repair  5. History of COVID-19 infection    Recommendations:  Currently stable from a cardiac standpoint. Baseline EKG with nonspecific changes. Functional capacity is presently limited, but given the diagnosis of malignancy at this point would not delay surgery for any cardiac testing. · May proceed with surgery  · RCRI class I risk, 0.4 risk of major cardiac event  · Increase physical activity as able  · Risk factor modification  · Follow-up as needed    The patient's current medication list, allergies, problem list and results of all previously ordered testing were reviewed at today's visit.     Kristian Bee MD, [facility]

## (undated) DEVICE — TOWEL,OR,DSP,ST,BLUE,DLX,10/PK,8PK/CS: Brand: MEDLINE

## (undated) DEVICE — SET ORTHO STD STORTSTD1

## (undated) DEVICE — SCREW BNE L52MM DIA2.7MM S STL ST VAR ANG LOK FULL THRD T8
Type: IMPLANTABLE DEVICE | Site: ELBOW | Status: NON-FUNCTIONAL
Removed: 2020-10-26

## (undated) DEVICE — SCREW BNE L48MM DIA2.7MM ANK S STL ST VAR ANG LOK FULL THRD
Type: IMPLANTABLE DEVICE | Site: ELBOW | Status: NON-FUNCTIONAL
Removed: 2020-10-26

## (undated) DEVICE — PACK,SHOULDER SPLIT: Brand: MEDLINE

## (undated) DEVICE — GOWN,BREATHABLE SLV,AURORA,XLG,STRL: Brand: MEDLINE

## (undated) DEVICE — BIT DRL L110MM DIA2.5MM ST G QUIK CPL NONRADIOPAQUE W/O STP

## (undated) DEVICE — GLOVE SURG SZ 8 L12IN FNGR THK79MIL GRN LTX FREE

## (undated) DEVICE — DRAPE,U/ SHT,SPLIT,PLAS,STERIL: Brand: MEDLINE

## (undated) DEVICE — 3M™ COBAN™ NL STERILE NON-LATEX SELF-ADHERENT WRAP, 2084S, 4 IN X 5 YD (10 CM X 4,5 M), 18 ROLLS/CASE: Brand: 3M™ COBAN™

## (undated) DEVICE — DRAPE C ARM W41XL74IN UNIV MOB W RUBBERBAND CLP

## (undated) DEVICE — PATIENT RETURN ELECTRODE, SINGLE-USE, CONTACT QUALITY MONITORING, ADULT, WITH 9FT CORD, FOR PATIENTS WEIGING OVER 33LBS. (15KG): Brand: MEGADYNE

## (undated) DEVICE — BLADE CLIPPER GEN PURP NS

## (undated) DEVICE — SPLINT ORTH W5XL30IN PLSTR OF PARIS FAST IMMOB OF FRAC HI

## (undated) DEVICE — SET ORTHO STD STORTSTD2

## (undated) DEVICE — 3M™ STERI-DRAPE™ U-DRAPE 1015: Brand: STERI-DRAPE™

## (undated) DEVICE — GOWN,BREATHABLE,IMP SLV 3XL AURORA: Brand: MEDLINE

## (undated) DEVICE — LOOP VES W25MM THK1MM MAXI RED SIL FLD REPELLENT 100 PER

## (undated) DEVICE — SURGICAL PROCEDURE PACK HND

## (undated) DEVICE — APPLICATOR MEDICATED 26 CC SOLUTION HI LT ORNG CHLORAPREP

## (undated) DEVICE — ELECTROSURGICAL PENCIL BUTTON SWITCH E-Z CLEAN COATED BLADE ELECTRODE 10 FT (3 M) CORD HOLSTER: Brand: MEGADYNE

## (undated) DEVICE — DRIP REDUCTION MANIFOLD

## (undated) DEVICE — DRAPE,REIN 53X77,STERILE: Brand: MEDLINE

## (undated) DEVICE — SLING ARM M L1375IN D75IN WHT POLY MESH ENVELOP MTL SIDE

## (undated) DEVICE — Z DISCONTINUED USE 2275686 GLOVE SURG SZ 8 L12IN FNGR THK13MIL WHT ISOLEX POLYISOPRENE

## (undated) DEVICE — CLOTH SURG PREP PREOPERATIVE CHLORHEXIDINE GLUC 2% READYPREP

## (undated) DEVICE — SCREW BNE L56MM DIA2.7MM S STL ST VAR ANG LOK FULL THRD T8
Type: IMPLANTABLE DEVICE | Site: ELBOW | Status: NON-FUNCTIONAL
Removed: 2020-10-26

## (undated) DEVICE — 3M™ IOBAN™ 2 ANTIMICROBIAL INCISE DRAPE 6640EZ: Brand: IOBAN™ 2

## (undated) DEVICE — BIT DRL L140MM DIA2MM QUIK CPL 3 FLUT CALIB DEPTH MRK W/O

## (undated) DEVICE — GOWN,AURORA,BRTHSLV,2XL,18/CS: Brand: MEDLINE

## (undated) DEVICE — SCREW BNE L54MM DIA2.7MM ANK S STL ST VAR ANG LOK FULL THRD
Type: IMPLANTABLE DEVICE | Site: ELBOW | Status: NON-FUNCTIONAL
Removed: 2020-10-26

## (undated) DEVICE — INTENDED FOR TISSUE SEPARATION, AND OTHER PROCEDURES THAT REQUIRE A SHARP SURGICAL BLADE TO PUNCTURE OR CUT.: Brand: BARD-PARKER ® STAINLESS STEEL BLADES

## (undated) DEVICE — DRILL SYSTEM 7

## (undated) DEVICE — DOUBLE BASIN SET: Brand: MEDLINE INDUSTRIES, INC.

## (undated) DEVICE — VESSEL LOOPS,MAXI, RED: Brand: DEVON